# Patient Record
Sex: MALE | Race: BLACK OR AFRICAN AMERICAN | Employment: FULL TIME | URBAN - METROPOLITAN AREA
[De-identification: names, ages, dates, MRNs, and addresses within clinical notes are randomized per-mention and may not be internally consistent; named-entity substitution may affect disease eponyms.]

---

## 2017-02-18 ENCOUNTER — ED HISTORICAL/CONVERTED ENCOUNTER (OUTPATIENT)
Dept: OTHER | Age: 57
End: 2017-02-18

## 2018-04-27 ENCOUNTER — ED HISTORICAL/CONVERTED ENCOUNTER (OUTPATIENT)
Dept: OTHER | Age: 58
End: 2018-04-27

## 2018-07-12 ENCOUNTER — OP HISTORICAL/CONVERTED ENCOUNTER (OUTPATIENT)
Dept: OTHER | Age: 58
End: 2018-07-12

## 2018-08-17 ENCOUNTER — ED HISTORICAL/CONVERTED ENCOUNTER (OUTPATIENT)
Dept: OTHER | Age: 58
End: 2018-08-17

## 2019-01-21 ENCOUNTER — ED HISTORICAL/CONVERTED ENCOUNTER (OUTPATIENT)
Dept: OTHER | Age: 59
End: 2019-01-21

## 2020-09-12 ENCOUNTER — HOSPITAL ENCOUNTER (EMERGENCY)
Age: 60
Discharge: HOME | End: 2020-09-12
Attending: EMERGENCY MEDICINE
Payer: COMMERCIAL

## 2020-09-12 ENCOUNTER — APPOINTMENT (OUTPATIENT)
Dept: GENERAL RADIOLOGY | Age: 60
End: 2020-09-12
Attending: EMERGENCY MEDICINE
Payer: COMMERCIAL

## 2020-09-12 VITALS
BODY MASS INDEX: 31.39 KG/M2 | RESPIRATION RATE: 18 BRPM | HEIGHT: 67 IN | HEART RATE: 64 BPM | WEIGHT: 200 LBS | SYSTOLIC BLOOD PRESSURE: 174 MMHG | TEMPERATURE: 98.3 F | OXYGEN SATURATION: 99 % | DIASTOLIC BLOOD PRESSURE: 102 MMHG

## 2020-09-12 DIAGNOSIS — R07.89 ATYPICAL CHEST PAIN: Primary | ICD-10-CM

## 2020-09-12 LAB
ALBUMIN SERPL-MCNC: 3.7 G/DL (ref 3.5–5)
ALBUMIN/GLOB SERPL: 0.8 {RATIO} (ref 1.1–2.2)
ALP SERPL-CCNC: 83 U/L (ref 45–117)
ALT SERPL-CCNC: 117 U/L (ref 12–78)
ANION GAP SERPL CALC-SCNC: 11 MMOL/L (ref 5–15)
AST SERPL W P-5'-P-CCNC: 171 U/L (ref 15–37)
BASOPHILS # BLD: 0 K/UL (ref 0–0.1)
BASOPHILS NFR BLD: 1 % (ref 0–1)
BILIRUB SERPL-MCNC: 0.9 MG/DL (ref 0.2–1)
BUN SERPL-MCNC: 5 MG/DL (ref 6–20)
BUN/CREAT SERPL: 5 (ref 12–20)
CA-I BLD-MCNC: 9.2 MG/DL (ref 8.5–10.1)
CHLORIDE SERPL-SCNC: 98 MMOL/L (ref 97–108)
CO2 SERPL-SCNC: 29 MMOL/L (ref 21–32)
CREAT SERPL-MCNC: 0.99 MG/DL (ref 0.7–1.3)
DIFFERENTIAL METHOD BLD: ABNORMAL
EOSINOPHIL # BLD: 0.2 K/UL (ref 0–0.4)
EOSINOPHIL NFR BLD: 3 % (ref 0–7)
ERYTHROCYTE [DISTWIDTH] IN BLOOD BY AUTOMATED COUNT: 13.9 % (ref 11.5–14.5)
GLOBULIN SER CALC-MCNC: 4.8 G/DL (ref 2–4)
GLUCOSE SERPL-MCNC: 132 MG/DL (ref 65–100)
HCT VFR BLD AUTO: 43.1 % (ref 36.6–50.3)
HGB BLD-MCNC: 15.3 % (ref 12.1–17)
IMM GRANULOCYTES # BLD AUTO: 0 K/UL (ref 0–0.04)
IMM GRANULOCYTES NFR BLD AUTO: 0 % (ref 0–0.5)
LYMPHOCYTES # BLD: 2 K/UL (ref 0.8–3.5)
LYMPHOCYTES NFR BLD: 35 % (ref 12–49)
MCH RBC QN AUTO: 34.9 PG (ref 26–34)
MCHC RBC AUTO-ENTMCNC: 35.5 G/DL (ref 30–36.5)
MCV RBC AUTO: 98.4 FL (ref 80–99)
MONOCYTES # BLD: 0.6 K/UL (ref 0–1)
MONOCYTES NFR BLD: 10 % (ref 5–13)
NEUTS SEG # BLD: 2.9 K/UL (ref 1.8–8)
NEUTS SEG NFR BLD: 51 % (ref 32–75)
PLATELET # BLD AUTO: 253 K/UL (ref 150–400)
PMV BLD AUTO: 12.1 FL (ref 8.9–12.9)
POTASSIUM SERPL-SCNC: 2.9 MMOL/L (ref 3.5–5.1)
PROT SERPL-MCNC: 8.5 G/DL (ref 6.4–8.2)
RBC # BLD AUTO: 4.38 M/UL (ref 4.1–5.7)
SODIUM SERPL-SCNC: 138 MMOL/L (ref 136–145)
TROPONIN I SERPL-MCNC: <0.05 NG/ML
WBC # BLD AUTO: 5.6 K/UL (ref 4.1–11.1)

## 2020-09-12 PROCEDURE — 36415 COLL VENOUS BLD VENIPUNCTURE: CPT

## 2020-09-12 PROCEDURE — 99285 EMERGENCY DEPT VISIT HI MDM: CPT

## 2020-09-12 PROCEDURE — 85025 COMPLETE CBC W/AUTO DIFF WBC: CPT

## 2020-09-12 PROCEDURE — 71045 X-RAY EXAM CHEST 1 VIEW: CPT

## 2020-09-12 PROCEDURE — 80053 COMPREHEN METABOLIC PANEL: CPT

## 2020-09-12 PROCEDURE — 84484 ASSAY OF TROPONIN QUANT: CPT

## 2020-09-12 PROCEDURE — 99284 EMERGENCY DEPT VISIT MOD MDM: CPT

## 2020-09-12 PROCEDURE — 93005 ELECTROCARDIOGRAM TRACING: CPT

## 2020-09-12 PROCEDURE — 74011250637 HC RX REV CODE- 250/637: Performed by: EMERGENCY MEDICINE

## 2020-09-12 RX ORDER — POTASSIUM CHLORIDE 750 MG/1
40 TABLET, FILM COATED, EXTENDED RELEASE ORAL
Status: COMPLETED | OUTPATIENT
Start: 2020-09-12 | End: 2020-09-12

## 2020-09-12 RX ORDER — GUAIFENESIN 100 MG/5ML
81 LIQUID (ML) ORAL
Status: COMPLETED | OUTPATIENT
Start: 2020-09-12 | End: 2020-09-12

## 2020-09-12 RX ORDER — DILTIAZEM HYDROCHLORIDE 240 MG/1
240 CAPSULE, EXTENDED RELEASE ORAL DAILY
COMMUNITY

## 2020-09-12 RX ORDER — METFORMIN HYDROCHLORIDE 500 MG/1
500 TABLET ORAL 2 TIMES DAILY WITH MEALS
Status: ON HOLD | COMMUNITY
End: 2021-09-15

## 2020-09-12 RX ORDER — METOPROLOL TARTRATE 50 MG/1
50 TABLET ORAL DAILY
COMMUNITY
End: 2020-09-15

## 2020-09-12 RX ORDER — SIMVASTATIN 20 MG/1
20 TABLET, FILM COATED ORAL
COMMUNITY

## 2020-09-12 RX ADMIN — POTASSIUM CHLORIDE 40 MEQ: 750 TABLET, FILM COATED, EXTENDED RELEASE ORAL at 12:02

## 2020-09-12 RX ADMIN — ASPIRIN 81 MG CHEWABLE TABLET 81 MG: 81 TABLET CHEWABLE at 09:00

## 2020-09-12 RX ADMIN — POTASSIUM CHLORIDE 40 MEQ: 750 TABLET, FILM COATED, EXTENDED RELEASE ORAL at 13:43

## 2020-09-12 NOTE — ED TRIAGE NOTES
Patient started having palpitations yesterday.  Has had episodes of on and off chest pain accompanied by SOB

## 2020-09-12 NOTE — ED PROVIDER NOTES
The history is provided by the patient. No  was used. Chest Pain (Angina)    This is a recurrent problem. The current episode started 1 to 2 hours ago. The problem has not changed since onset. The pain is associated with exertion, normal activity and stress. The pain is at a severity of 3/10. The pain is moderate. The quality of the pain is described as pressure-like. The pain does not radiate. Associated symptoms include diaphoresis, dizziness, palpitations, shortness of breath and vomiting. Pertinent negatives include no abdominal pain, no cough, no fever, no nausea and no weakness. He has tried nothing for the symptoms. Risk factors include cardiac disease, hypertension and male gender. Shortness of Breath   Associated symptoms include sore throat, chest pain and vomiting. Pertinent negatives include no fever, no rhinorrhea, no ear pain, no cough, no wheezing, no abdominal pain, no rash and no leg swelling. Palpitations    This is a recurrent problem. The current episode started 1 to 2 hours ago. The problem has not changed since onset. Associated symptoms include diaphoresis, chest pain, vomiting, dizziness and shortness of breath. Pertinent negatives include no fever, no abdominal pain, no nausea, no weakness and no cough. Risk factors include male gender, hypertension and cardiac disease.         Past Medical History:   Diagnosis Date    CAD (coronary artery disease)     Diabetes ([address]     Hypertension        Past Surgical History:   Procedure Laterality Date    IR INJ SINUS TRACT THERAP  1998         Family History:   Problem Relation Age of Onset    Hypertension Mother     Hypertension Father     Diabetes Father     Arrhythmia Sister     Hypertension Brother     Stroke Brother     Hypertension Sister        Social History     Socioeconomic History    Marital status:      Spouse name: Not on file    Number of children: Not on file    Years of education: Not on file  Highest education level: Not on file   Occupational History    Not on file   Social Needs    Financial resource strain: Not on file    Food insecurity     Worry: Not on file     Inability: Not on file    Transportation needs     Medical: Not on file     Non-medical: Not on file   Tobacco Use    Smoking status: Former Smoker     Types: Cigarettes     Last attempt to quit: 1/1/2010     Years since quitting: 10.7    Smokeless tobacco: Never Used   Substance and Sexual Activity    Alcohol use: Yes     Alcohol/week: 2.5 - 3.3 standard drinks     Types: 3 - 4 Standard drinks or equivalent per week     Comment: occasional    Drug use: No    Sexual activity: Yes     Partners: Female   Lifestyle    Physical activity     Days per week: Not on file     Minutes per session: Not on file    Stress: Not on file   Relationships    Social connections     Talks on phone: Not on file     Gets together: Not on file     Attends Yazdanism service: Not on file     Active member of club or organization: Not on file     Attends meetings of clubs or organizations: Not on file     Relationship status: Not on file    Intimate partner violence     Fear of current or ex partner: Not on file     Emotionally abused: Not on file     Physically abused: Not on file     Forced sexual activity: Not on file   Other Topics Concern    Not on file   Social History Narrative    Not on file         ALLERGIES: Patient has no known allergies. Review of Systems   Constitutional: Positive for diaphoresis. Negative for fever. HENT: Positive for sore throat. Negative for congestion, ear pain and rhinorrhea. Eyes: Positive for photophobia. Respiratory: Positive for chest tightness and shortness of breath. Negative for cough, wheezing and stridor. Cardiovascular: Positive for chest pain and palpitations. Negative for leg swelling. Gastrointestinal: Positive for vomiting. Negative for abdominal pain and nausea.    Endocrine: Negative for polydipsia and polyuria. Genitourinary: Negative for dysuria. Musculoskeletal: Negative for arthralgias and joint swelling. Skin: Positive for color change. Negative for rash. Neurological: Positive for dizziness and light-headedness. Negative for syncope and weakness. Psychiatric/Behavioral: Negative for confusion and suicidal ideas. Vitals:    09/12/20 0504   BP: (!) 166/94   Pulse: 63   Resp: 20   Temp: 98.9 °F (37.2 °C)   SpO2: 98%   Weight: 90.7 kg (200 lb)   Height: 5' 7\" (1.702 m)            Physical Exam  Vitals signs and nursing note reviewed. Constitutional:       Appearance: He is well-developed. HENT:      Head: Normocephalic and atraumatic. Eyes:      Pupils: Pupils are equal, round, and reactive to light. Neck:      Thyroid: No thyromegaly. Cardiovascular:      Rate and Rhythm: Normal rate. Rhythm irregular. Heart sounds: Normal heart sounds. Pulmonary:      Effort: Pulmonary effort is normal.      Breath sounds: Normal breath sounds. Chest:      Chest wall: No deformity or tenderness. Abdominal:      General: Bowel sounds are normal.      Palpations: Abdomen is soft. Musculoskeletal: Normal range of motion. Right lower leg: No edema. Left lower leg: No edema. Skin:     General: Skin is warm and dry. Neurological:      General: No focal deficit present. Mental Status: He is alert. Psychiatric:         Mood and Affect: Mood is anxious. MDM  Number of Diagnoses or Management Options  Diagnosis management comments: Patient with a history of irregular heartbeat followed by a cardiologist and is been put on diltiazem and Toprol for for control he has done well but tonight he developed spontaneous symptoms of palpitations treated with chest pain. Check cardiac enzymes EKG is normal and we will check electrolytes and magnesium level and chest x-ray.     ED Course as of Sep 12 0601   Sat Sep 12, 2020   0600 Sinus rhythm at a rate of 67 bpm there is a normal sinus rhythm there is no ectopy there is there is normal QRS complexes and intervals there are no ST or T wave changes    [RL]      ED Course User Index  [RL] Marcio Barrera MD       Impression     Atypical chest pain

## 2020-09-12 NOTE — DISCHARGE INSTRUCTIONS

## 2020-09-13 ENCOUNTER — HOSPITAL ENCOUNTER (OUTPATIENT)
Age: 60
Setting detail: OBSERVATION
Discharge: HOME | End: 2020-09-15
Attending: EMERGENCY MEDICINE | Admitting: FAMILY MEDICINE
Payer: COMMERCIAL

## 2020-09-13 ENCOUNTER — APPOINTMENT (OUTPATIENT)
Dept: GENERAL RADIOLOGY | Age: 60
End: 2020-09-13
Attending: EMERGENCY MEDICINE
Payer: COMMERCIAL

## 2020-09-13 DIAGNOSIS — I10 ESSENTIAL HYPERTENSION: ICD-10-CM

## 2020-09-13 DIAGNOSIS — I49.9 CARDIAC ARRHYTHMIA, UNSPECIFIED CARDIAC ARRHYTHMIA TYPE: ICD-10-CM

## 2020-09-13 DIAGNOSIS — R55 NEAR SYNCOPE: Primary | ICD-10-CM

## 2020-09-13 PROBLEM — E11.9 DIABETES MELLITUS TYPE 2, CONTROLLED (HCC): Status: ACTIVE | Noted: 2020-09-13

## 2020-09-13 LAB
ANION GAP SERPL CALC-SCNC: 7 MMOL/L (ref 5–15)
BASOPHILS # BLD: 0 K/UL (ref 0–0.1)
BASOPHILS NFR BLD: 0 % (ref 0–1)
BUN SERPL-MCNC: 7 MG/DL (ref 6–20)
BUN/CREAT SERPL: 6 (ref 12–20)
CA-I BLD-MCNC: 8.9 MG/DL (ref 8.5–10.1)
CHLORIDE SERPL-SCNC: 101 MMOL/L (ref 97–108)
CO2 SERPL-SCNC: 30 MMOL/L (ref 21–32)
CREAT SERPL-MCNC: 1.23 MG/DL (ref 0.7–1.3)
DIFFERENTIAL METHOD BLD: ABNORMAL
EOSINOPHIL # BLD: 0.2 K/UL (ref 0–0.4)
EOSINOPHIL NFR BLD: 2 % (ref 0–7)
ERYTHROCYTE [DISTWIDTH] IN BLOOD BY AUTOMATED COUNT: 14.2 % (ref 11.5–14.5)
GLUCOSE BLD STRIP.AUTO-MCNC: 124 MG/DL (ref 65–100)
GLUCOSE SERPL-MCNC: 125 MG/DL (ref 65–100)
HCT VFR BLD AUTO: 48.8 % (ref 36.6–50.3)
HGB BLD-MCNC: 17.7 % (ref 12.1–17)
IMM GRANULOCYTES # BLD AUTO: 0 K/UL (ref 0–0.04)
IMM GRANULOCYTES NFR BLD AUTO: 0 % (ref 0–0.5)
LYMPHOCYTES # BLD: 4.4 K/UL (ref 0.8–3.5)
LYMPHOCYTES NFR BLD: 49 % (ref 12–49)
MAGNESIUM SERPL-MCNC: 1.4 MG/DL (ref 1.6–2.4)
MCH RBC QN AUTO: 36.2 PG (ref 26–34)
MCHC RBC AUTO-ENTMCNC: 36.3 G/DL (ref 30–36.5)
MCV RBC AUTO: 99.8 FL (ref 80–99)
MONOCYTES # BLD: 1 K/UL (ref 0–1)
MONOCYTES NFR BLD: 11 % (ref 5–13)
NEUTS SEG # BLD: 3.5 K/UL (ref 1.8–8)
NEUTS SEG NFR BLD: 38 % (ref 32–75)
PERFORMED BY, TECHID: ABNORMAL
PLATELET # BLD AUTO: 270 K/UL (ref 150–400)
PMV BLD AUTO: 12.1 FL (ref 8.9–12.9)
POTASSIUM SERPL-SCNC: 3.8 MMOL/L (ref 3.5–5.1)
RBC # BLD AUTO: 4.89 M/UL (ref 4.1–5.7)
SODIUM SERPL-SCNC: 138 MMOL/L (ref 136–145)
TROPONIN I SERPL-MCNC: <0.05 NG/ML
TSH SERPL DL<=0.05 MIU/L-ACNC: 2.63 UIU/ML (ref 0.36–3.74)
WBC # BLD AUTO: 9.1 K/UL (ref 4.1–11.1)

## 2020-09-13 PROCEDURE — 74011250637 HC RX REV CODE- 250/637: Performed by: EMERGENCY MEDICINE

## 2020-09-13 PROCEDURE — 80048 BASIC METABOLIC PNL TOTAL CA: CPT

## 2020-09-13 PROCEDURE — 71045 X-RAY EXAM CHEST 1 VIEW: CPT

## 2020-09-13 PROCEDURE — 36415 COLL VENOUS BLD VENIPUNCTURE: CPT

## 2020-09-13 PROCEDURE — 85025 COMPLETE CBC W/AUTO DIFF WBC: CPT

## 2020-09-13 PROCEDURE — 74011250637 HC RX REV CODE- 250/637: Performed by: INTERNAL MEDICINE

## 2020-09-13 PROCEDURE — 99285 EMERGENCY DEPT VISIT HI MDM: CPT

## 2020-09-13 PROCEDURE — 99218 HC RM OBSERVATION: CPT

## 2020-09-13 PROCEDURE — 84484 ASSAY OF TROPONIN QUANT: CPT

## 2020-09-13 PROCEDURE — 93005 ELECTROCARDIOGRAM TRACING: CPT | Performed by: EMERGENCY MEDICINE

## 2020-09-13 PROCEDURE — 84443 ASSAY THYROID STIM HORMONE: CPT

## 2020-09-13 PROCEDURE — 82962 GLUCOSE BLOOD TEST: CPT

## 2020-09-13 PROCEDURE — 74011250637 HC RX REV CODE- 250/637: Performed by: PHYSICIAN ASSISTANT

## 2020-09-13 PROCEDURE — 93005 ELECTROCARDIOGRAM TRACING: CPT | Performed by: PHYSICIAN ASSISTANT

## 2020-09-13 PROCEDURE — 83735 ASSAY OF MAGNESIUM: CPT

## 2020-09-13 RX ORDER — FAMOTIDINE 20 MG/1
20 TABLET, FILM COATED ORAL 2 TIMES DAILY
Status: DISCONTINUED | OUTPATIENT
Start: 2020-09-13 | End: 2020-09-15 | Stop reason: HOSPADM

## 2020-09-13 RX ORDER — METFORMIN HYDROCHLORIDE 500 MG/1
500 TABLET ORAL 2 TIMES DAILY WITH MEALS
Status: DISCONTINUED | OUTPATIENT
Start: 2020-09-13 | End: 2020-09-15 | Stop reason: HOSPADM

## 2020-09-13 RX ORDER — HYDRALAZINE HYDROCHLORIDE 20 MG/ML
20 INJECTION INTRAMUSCULAR; INTRAVENOUS
Status: DISCONTINUED | OUTPATIENT
Start: 2020-09-13 | End: 2020-09-15 | Stop reason: HOSPADM

## 2020-09-13 RX ORDER — SIMVASTATIN 10 MG/1
20 TABLET, FILM COATED ORAL
Status: DISCONTINUED | OUTPATIENT
Start: 2020-09-13 | End: 2020-09-15 | Stop reason: HOSPADM

## 2020-09-13 RX ORDER — ONDANSETRON 2 MG/ML
4 INJECTION INTRAMUSCULAR; INTRAVENOUS
Status: DISCONTINUED | OUTPATIENT
Start: 2020-09-13 | End: 2020-09-15 | Stop reason: HOSPADM

## 2020-09-13 RX ORDER — METOPROLOL TARTRATE 50 MG/1
50 TABLET ORAL 2 TIMES DAILY
Status: DISCONTINUED | OUTPATIENT
Start: 2020-09-13 | End: 2020-09-15 | Stop reason: HOSPADM

## 2020-09-13 RX ORDER — ONDANSETRON 4 MG/1
4 TABLET, ORALLY DISINTEGRATING ORAL
Status: DISCONTINUED | OUTPATIENT
Start: 2020-09-13 | End: 2020-09-15 | Stop reason: HOSPADM

## 2020-09-13 RX ORDER — ENOXAPARIN SODIUM 100 MG/ML
40 INJECTION SUBCUTANEOUS DAILY
Status: DISCONTINUED | OUTPATIENT
Start: 2020-09-14 | End: 2020-09-15 | Stop reason: HOSPADM

## 2020-09-13 RX ORDER — POLYETHYLENE GLYCOL 3350 17 G/17G
17 POWDER, FOR SOLUTION ORAL DAILY PRN
Status: DISCONTINUED | OUTPATIENT
Start: 2020-09-13 | End: 2020-09-15 | Stop reason: HOSPADM

## 2020-09-13 RX ORDER — ASPIRIN 325 MG
325 TABLET ORAL
Status: COMPLETED | OUTPATIENT
Start: 2020-09-13 | End: 2020-09-13

## 2020-09-13 RX ORDER — ACETAMINOPHEN 650 MG/1
650 SUPPOSITORY RECTAL
Status: DISCONTINUED | OUTPATIENT
Start: 2020-09-13 | End: 2020-09-15 | Stop reason: HOSPADM

## 2020-09-13 RX ORDER — SODIUM CHLORIDE 0.9 % (FLUSH) 0.9 %
5-40 SYRINGE (ML) INJECTION EVERY 8 HOURS
Status: DISCONTINUED | OUTPATIENT
Start: 2020-09-13 | End: 2020-09-15 | Stop reason: HOSPADM

## 2020-09-13 RX ORDER — MAGNESIUM SULFATE 100 %
4 CRYSTALS MISCELLANEOUS AS NEEDED
Status: DISCONTINUED | OUTPATIENT
Start: 2020-09-13 | End: 2020-09-15 | Stop reason: HOSPADM

## 2020-09-13 RX ORDER — INSULIN LISPRO 100 [IU]/ML
INJECTION, SOLUTION INTRAVENOUS; SUBCUTANEOUS 4 TIMES DAILY
Status: DISCONTINUED | OUTPATIENT
Start: 2020-09-13 | End: 2020-09-15 | Stop reason: HOSPADM

## 2020-09-13 RX ORDER — ACETAMINOPHEN 325 MG/1
650 TABLET ORAL
Status: DISCONTINUED | OUTPATIENT
Start: 2020-09-13 | End: 2020-09-15 | Stop reason: HOSPADM

## 2020-09-13 RX ORDER — SODIUM CHLORIDE 0.9 % (FLUSH) 0.9 %
5-40 SYRINGE (ML) INJECTION AS NEEDED
Status: DISCONTINUED | OUTPATIENT
Start: 2020-09-13 | End: 2020-09-15 | Stop reason: HOSPADM

## 2020-09-13 RX ORDER — POTASSIUM CHLORIDE 1.5 G/1.77G
20 POWDER, FOR SOLUTION ORAL 2 TIMES DAILY WITH MEALS
Status: DISCONTINUED | OUTPATIENT
Start: 2020-09-13 | End: 2020-09-15 | Stop reason: HOSPADM

## 2020-09-13 RX ORDER — DEXTROSE 50 % IN WATER (D50W) INTRAVENOUS SYRINGE
25-50 AS NEEDED
Status: DISCONTINUED | OUTPATIENT
Start: 2020-09-13 | End: 2020-09-15 | Stop reason: HOSPADM

## 2020-09-13 RX ORDER — DILTIAZEM HYDROCHLORIDE 120 MG/1
240 CAPSULE, COATED, EXTENDED RELEASE ORAL DAILY
Status: DISCONTINUED | OUTPATIENT
Start: 2020-09-14 | End: 2020-09-15 | Stop reason: HOSPADM

## 2020-09-13 RX ORDER — ASPIRIN 325 MG
325 TABLET ORAL DAILY
Status: DISCONTINUED | OUTPATIENT
Start: 2020-09-14 | End: 2020-09-15 | Stop reason: HOSPADM

## 2020-09-13 RX ADMIN — ASPIRIN 325 MG ORAL TABLET 325 MG: 325 PILL ORAL at 13:25

## 2020-09-13 RX ADMIN — NITROGLYCERIN 1 INCH: 20 OINTMENT TOPICAL at 13:25

## 2020-09-13 RX ADMIN — SIMVASTATIN 20 MG: 10 TABLET, FILM COATED ORAL at 23:08

## 2020-09-13 RX ADMIN — Medication 10 ML: at 21:28

## 2020-09-13 RX ADMIN — POTASSIUM CHLORIDE 20 MEQ: 1.5 FOR SOLUTION ORAL at 21:25

## 2020-09-13 RX ADMIN — FAMOTIDINE 20 MG: 20 TABLET ORAL at 21:25

## 2020-09-13 NOTE — H&P
History and Physical    Patient: Annmarie Olsen MRN: [id number]  SSN: [id number]    YOB: 1960  Age: 64 y.o. Sex: male      Subjective:      Annmarie Olsen is a 64 y.o. male who black male who presented to the ER on 9/13/2020 for palpitations. He said he has been having on and off palpitations for several weeks. He does have a history of atrial fibrillation. Not on anticoagulation. He said recently his primary care physician increased his dose of diltiazem and decreased his dose of metoprolol due to side effects. While he was in the waiting room he said he had a near syncopal episode where he got diaphoretic. He says he does have some chest pains when he is feeling the palpitations. Currently patient is chest pain-free. He did have an episode of diarrhea which she contributes it to the metformin. In the ED chest x-ray did not show any acute cardiopulmonary process. EKG showed normal sinus rhythm. Cardiology consulted increase patient's metoprolol to 50 mg twice daily. Laboratory data showed a potassium of 2.9. Most likely secondary to diarrhea    Past Medical History:   Diagnosis Date    CAD (coronary artery disease)     Diabetes ([address]     Hypertension      Past Surgical History:   Procedure Laterality Date    IR INJ SINUS TRACT THERAP  1998      Family History   Problem Relation Age of Onset    Hypertension Mother     Hypertension Father     Diabetes Father     Arrhythmia Sister     Hypertension Brother     Stroke Brother     Hypertension Sister      Social History     Tobacco Use    Smoking status: Former Smoker     Types: Cigarettes     Last attempt to quit: 1/1/2010     Years since quitting: 10.7    Smokeless tobacco: Never Used   Substance Use Topics    Alcohol use:  Yes     Alcohol/week: 2.5 - 3.3 standard drinks     Types: 3 - 4 Standard drinks or equivalent per week     Comment: occasional      Prior to Admission medications    Medication Sig Start Date End Date Taking? Authorizing Provider   dilTIAZem ER [facility]) 240 mg capsule Take 240 mg by mouth daily. Candelario Mcgill MD   metoprolol tartrate (LOPRESSOR) 50 mg tablet Take 50 mg by mouth daily. Candelario Mcgill MD   metFORMIN (GLUCOPHAGE) 500 mg tablet Take 500 mg by mouth two (2) times daily (with meals). Candelario Mcgill MD   vit B Cmplx 3-FA-Vit C-Biotin (NEPHRO PANFILO RX) 1- mg-mg-mcg tablet Take 1 Tab by mouth daily. Candelario Mcgill MD   simvastatin (ZOCOR) 20 mg tablet Take 20 mg by mouth nightly. Candelario Mcgill MD   aspirin (ASPIRIN) 325 mg tablet Take 325 mg by mouth daily. Provider, Historical        No Known Allergies    Review of Systems:  Constitutional: No fevers, No chills, No fatigue, No weakness  Eyes: No visual disturbance  Ears, Nose, Mouth, Throat, and Face: No nasal congestion, No sore throat  Respiratory: No cough, No sputum, No wheezing, No SOB  Cardiovascular: No chest pain, No lower extremity edema, ++ Palpitations   Gastrointestinal: No nausea, No vomiting, ++ diarrhea, No constipation, No abdominal pain  Genitourinary: No frequency, No dysuria, No hematuria  Integument/Breast: No rash, No skin lesion(s), No dryness  Musculoskeletal: No arthralgias, No neck pain, No back pain  Neurological: No headaches, No dizziness, No confusion,  No seizures  Behavioral/Psychiatric: No anxiety, No depression      Objective:     Vitals:    09/13/20 1254   BP: (!) 185/98   Pulse: 72   Resp: 16   Temp: 98.2 °F (36.8 °C)   SpO2: 100%   Weight: 90.7 kg (200 lb)   Height: 5' 7\" (1.702 m)        Physical Exam:  General: alert, cooperative, no distress  Eye: conjunctivae/corneas clear. PERRL, EOM's intact. Throat and Neck: normal and no erythema or exudates noted. No mass   Lung: clear to auscultation bilaterally  Heart: irregular,   Abdomen: soft, non-tender. Bowel sounds normal. No masses,  Extremities:  able to move all extremities normal, atraumatic  Skin: Normal.  Neurologic: AOx3.  Cranial nerves 2-12 and sensation grossly intact. Psychiatric: non focal    Recent Results (from the past 24 hour(s))   CBC WITH AUTOMATED DIFF    Collection Time: 09/13/20  1:30 PM   Result Value Ref Range    WBC 9.1 4.1 - 11.1 K/uL    RBC 4.89 4. 10 - 5.70 M/uL    HGB 17.7 (H) 12.1 - 17.0 %    HCT 48.8 36.6 - 50.3 %    MCV 99.8 (H) 80.0 - 99.0 FL    MCH 36.2 (H) 26.0 - 34.0 PG    MCHC 36.3 30.0 - 36.5 g/dL    RDW 14.2 11.5 - 14.5 %    PLATELET 473 833 - 468 K/uL    MPV 12.1 8.9 - 12.9 FL    NEUTROPHILS 38 32 - 75 %    LYMPHOCYTES 49 12 - 49 %    MONOCYTES 11 5 - 13 %    EOSINOPHILS 2 0 - 7 %    BASOPHILS 0 0 - 1 %    IMMATURE GRANULOCYTES 0 0.0 - 0.5 %    ABS. NEUTROPHILS 3.5 1.8 - 8.0 K/UL    ABS. LYMPHOCYTES 4.4 (H) 0.8 - 3.5 K/UL    ABS. MONOCYTES 1.0 0.0 - 1.0 K/UL    ABS. EOSINOPHILS 0.2 0.0 - 0.4 K/UL    ABS. BASOPHILS 0.0 0.0 - 0.1 K/UL    ABS. IMM. GRANS. 0.0 0.00 - 0.04 K/UL    DF AUTOMATED         XR Results (maximum last 3): Results from Hospital Encounter encounter on 09/13/20   XR CHEST PORT    Narrative Single frontal view of the chest compared to prior exam dated 9/12/2020. Heart  size is normal. Lungs are clear of infiltrate. No pulmonary nodule or pleural  effusion is seen. No bony abnormalities are identified. Impression Impression: No acute abnormality demonstrated, unchanged. Results from [place] encounter on 09/12/20   XR CHEST PORT    Narrative Single AP upright view of the chest. No prior films are currently available for  comparison. Heart size is normal. Lungs are clear of infiltrate. No pulmonary  nodule or pleural effusion is seen. No bony abnormalities are identified. Impression Impression: Negative. CT Results (maximum last 3): No results found for this or any previous visit. MRI Results (maximum last 3): No results found for this or any previous visit. Nuclear Medicine Results (maximum last 3): No results found for this or any previous visit.     US Results (maximum last 3): No results found for this or any previous visit. Assessment:   1. Palpitations with a history of proximal atrial fibrillation  2. Near syncopal episode  3. Hypertension  4. Type 2 diabetes  5. Hyperlipidemia  6. Symptomatic hypokalemia    Plan:   1. We will consult cardiology. Continue with cardiac telemetry. Troponin x1-. EKG showed normal sinus rhythm. EKG in the AM. They have increased patient's beta-blocker to 50 mg twice daily. Patient is alert and orient x4.  2.  Blood pressure is elevated. Continue with diltiazem, metoprolol. Have added on IV hydralazine as needed to keep his systolic blood pressure under 160  3. Continue with metformin. Will add on insulin sliding scale  4. Continue with aspirin and statin  5. Potassium is low. We will supplement. Check a magnesium level  6. CBC BMP in the a.m.  7.  Lovenox for DVT prophylaxis  8. Full code  9.   Place in observation    Total Time: 61 min     Signed By: Dany Butler PA-C     September 13, 2020

## 2020-09-13 NOTE — CONSULTS
Consult    Patient: Alexis Perez MRN: [id number]  SSN: [id number]    YOB: 1960  Age: 64 y.o. Sex: male      Subjective:      Alexis Perez is a 64 y.o. male who is being seen for palpitations and near syncope. He follows up with Dr. Syed Taylor regularly. Last time he saw him about a year ago. He had a monitor that showed PVCs. He had similar complaints before and he was admitted for hypokalemia. Patient with diabetes and presented with frequent palpitation and getting worse and so he drove himself to the emergency room and upon arrival he had one severe episode of palpitation and he almost passed out but never did. Patient was on metoprolol 50 mg twice a day at some point that has controlled his PVCs better but his primary care physician for some reason reduce the dose to 50 mg daily. He is complaining of diarrhea recently which probably explains why his potassium is low. At some point he was told that he might have had sleep apnea but never had an official sleep study. Past Medical History:   Diagnosis Date    CAD (coronary artery disease)     [address] [address] [facility]     Hypertension      Past Surgical History:   Procedure Laterality Date    IR INJ SINUS TRACT THERAP  1998      Family History   Problem Relation Age of Onset    Hypertension Mother     Hypertension Father     Diabetes Father     Arrhythmia Sister     Hypertension Brother     Stroke Brother     Hypertension Sister      Social History     Tobacco Use    Smoking status: Former Smoker     Types: Cigarettes     Last attempt to quit: 1/1/2010     Years since quitting: 10.7    Smokeless tobacco: Never Used   Substance Use Topics    Alcohol use: Yes     Alcohol/week: 2.5 - 3.3 standard drinks     Types: 3 - 4 Standard drinks or equivalent per week     Comment: occasional      Current Outpatient Medications   Medication Sig Dispense Refill    dilTIAZem ER (TIAZAC) 240 mg capsule Take 240 mg by mouth daily.       metoprolol tartrate (LOPRESSOR) 50 mg tablet Take 50 mg by mouth daily.  metFORMIN (GLUCOPHAGE) 500 mg tablet Take 500 mg by mouth two (2) times daily (with meals).  vit B Cmplx 3-FA-Vit C-Biotin (NEPHRO PANFILO RX) 1- mg-mg-mcg tablet Take 1 Tab by mouth daily.  simvastatin (ZOCOR) 20 mg tablet Take 20 mg by mouth nightly.  aspirin (ASPIRIN) 325 mg tablet Take 325 mg by mouth daily. No Known Allergies    Review of Systems:  A comprehensive review of systems was negative except for that written in the History of Present Illness. Objective:     Vitals:    09/13/20 1254   BP: (!) 185/98   Pulse: 72   Resp: 16   Temp: 98.2 °F (36.8 °C)   SpO2: 100%   Weight: 90.7 kg (200 lb)   Height: 5' 7\" (1.702 m)        Physical Exam:  General:  Alert, cooperative, no distress, appears stated age. Eyes:  Conjunctivae/corneas clear. PERRL, EOMs intact. Fundi benign   Ears:  Normal TMs and external ear canals both ears. Nose: Nares normal. Septum midline. Mucosa normal. No drainage or sinus tenderness. Mouth/Throat: Lips, mucosa, and tongue normal. Teeth and gums normal.   Neck: Supple, symmetrical, trachea midline, no adenopathy, thyroid: no enlargment/tenderness/nodules, no carotid bruit and no JVD. Back:   Symmetric, no curvature. ROM normal. No CVA tenderness. Lungs:   Clear to auscultation bilaterally. Heart:  Regular rate and rhythm, S1, S2 normal, no murmur, click, rub or gallop. Abdomen:   Soft, non-tender. Bowel sounds normal. No masses,  No organomegaly. Extremities: Extremities normal, atraumatic, no cyanosis or edema. Pulses: 2+ and symmetric all extremities. Skin: Skin color, texture, turgor normal. No rashes or lesions   Lymph nodes: Cervical, supraclavicular, and axillary nodes normal.   Neurologic: CNII-XII intact. Normal strength, sensation and reflexes throughout.        Assessment:     Hospital Problems  Date Reviewed: 12/28/2011    None      Patient is 51-year-old -American male with:    1. Near syncope, frequent PVCs  2. Hypokalemia  3. Hypertensive emergency  4. Dyslipidemia  5. Lone atrial fibrillation    6. Obstructive sleep apnea  7. Gastroesophageal reflux disease (GERD)  8. History of pancreatitis  9. Diabetes mellitus      Plan:     EKG shows sinus rhythm, LVH, PVC without acute ischemic changes. He appears to be euvolemic. His potassium is 2.9, sodium 138. Try to maintain potassium more than 4. Check magnesium level and try to maintain the level more than 2. Previously echocardiogram showed normal LV systolic function. His troponin were negative. We will admit to telemetry and increase his metoprolol to 50 mg twice a day. Monitor his blood pressure and his heart rate closely. Dr. Sneha Tello will assume his care in a.m. Consider outpatient sleep study  Thank you  for involving me in this nice patient's care.   Signed By: Todd Seymour MD     September 13, 2020

## 2020-09-13 NOTE — LETTER
NOTIFICATION RETURN TO WORK / SCHOOL 
 
9/15/2020 9:19 AM 
 
Mr. Any Almaraz 
[id number] To Whom It May Concern: 
 
Any Almaraz is currently under the care of [facility]. Admitted 9/13-9/15 He will return to work/school after evaluation with Cardiologist 
 
If there are questions or concerns please have the patient contact our office. Sincerely, Sue Mora PA-C No name on file.

## 2020-09-13 NOTE — ED PROVIDER NOTES
EMERGENCY DEPARTMENT HISTORY AND PHYSICAL EXAM      Date: 9/13/2020  Patient Name: Annmarie Olsen    History of Presenting Illness     Chief Complaint   Patient presents with    Palpitations       History Provided By: Patient    HPI: Annmarie Olsen, 64 y.o. male presents to the ED with cc of   Chief Complaint   Patient presents with    Palpitations     Patient states that he had an episode of palpitation with chest tightness and then he passed out while he was in the waiting room in the ER feels somewhat better now  Denies any fever denies any shortness of breath denies any exposure to COVID-19    There are no other complaints, changes, or physical findings at this time. PCP: Agnieszka Quezada MD    No current facility-administered medications on file prior to encounter. Current Outpatient Medications on File Prior to Encounter   Medication Sig Dispense Refill    dilTIAZem ER (TIAZAC) 240 mg capsule Take 240 mg by mouth daily.  metoprolol tartrate (LOPRESSOR) 50 mg tablet Take 50 mg by mouth daily.  metFORMIN (GLUCOPHAGE) 500 mg tablet Take 500 mg by mouth two (2) times daily (with meals).  vit B Cmplx 3-FA-Vit C-Biotin (NEPHRO PANFILO RX) 1- mg-mg-mcg tablet Take 1 Tab by mouth daily.  simvastatin (ZOCOR) 20 mg tablet Take 20 mg by mouth nightly.  aspirin (ASPIRIN) 325 mg tablet Take 325 mg by mouth daily.          Past History     Past Medical History:  Past Medical History:   Diagnosis Date    CAD (coronary artery disease)     Diabetes ([address]     Hypertension        Past Surgical History:  Past Surgical History:   Procedure Laterality Date    IR INJ SINUS TRACT THERAP  1998       Family History:  Family History   Problem Relation Age of Onset   [address] Hypertension Mother     Hypertension Father     Diabetes Father     Arrhythmia Sister     Hypertension Brother     Stroke Brother     Hypertension Sister        Social History:  Social History     Tobacco Use    Smoking status: Former Smoker     Types: Cigarettes     Last attempt to quit: 1/1/2010     Years since quitting: 10.7    Smokeless tobacco: Never Used   Substance Use Topics    Alcohol use: Yes     Alcohol/week: 2.5 - 3.3 standard drinks     Types: 3 - 4 Standard drinks or equivalent per week     Comment: occasional    Drug use: No       Allergies:  No Known Allergies      Review of Systems   Review of Systems   Constitutional: Negative. HENT: Negative for congestion, nosebleeds, sinus pressure and sinus pain. Eyes: Negative. Negative for photophobia. Respiratory: Negative for apnea, cough, choking, chest tightness, shortness of breath, wheezing and stridor. Cardiovascular: Positive for chest pain and palpitations. Gastrointestinal: Negative. Genitourinary: Negative. Musculoskeletal: Negative. Negative for back pain and gait problem. Skin: Negative. Allergic/Immunologic: Negative. Neurological: Positive for syncope. Negative for weakness, light-headedness and numbness. Hematological: Negative. Psychiatric/Behavioral: Negative. Physical Exam   Physical Exam  Vitals signs and nursing note reviewed. Constitutional:       Appearance: Normal appearance. He is normal weight. HENT:      Head: Normocephalic and atraumatic. Nose: No congestion or rhinorrhea. Eyes:      Extraocular Movements: Extraocular movements intact. Pupils: Pupils are equal, round, and reactive to light. Neck:      Musculoskeletal: Normal range of motion and neck supple. Cardiovascular:      Rate and Rhythm: Normal rate and regular rhythm. Pulmonary:      Effort: Pulmonary effort is normal.      Breath sounds: Normal breath sounds. Abdominal:      General: Abdomen is flat. Bowel sounds are normal. There is no distension. Tenderness: There is no abdominal tenderness. There is no guarding. Musculoskeletal: Normal range of motion. Skin:     General: Skin is warm and dry. Neurological:      General: No focal deficit present. Mental Status: He is alert and oriented to person, place, and time. Psychiatric:         Mood and Affect: Mood normal.         Behavior: Behavior normal.         Diagnostic Study Results     Labs -   No results found for this or any previous visit (from the past 12 hour(s)). Radiologic Studies -   XR CHEST PORT    (Results Pending)     CT Results  (Last 48 hours)    None        CXR Results  (Last 48 hours)               09/12/20 0630  XR CHEST PORT Final result    Impression:  Impression: Negative. Narrative:  Single AP upright view of the chest. No prior films are currently available for   comparison. Heart size is normal. Lungs are clear of infiltrate. No pulmonary   nodule or pleural effusion is seen. No bony abnormalities are identified. Radiology report and labs reviewed by me    Medical Decision Making   I am the first provider for this patient. I reviewed the vital signs, available nursing notes, past medical history, past surgical history, family history and social history. Vital Signs-Reviewed the patient's vital signs. Patient Vitals for the past 12 hrs:   Temp Pulse Resp BP SpO2   09/13/20 1254 98.2 °F (36.8 °C) 72 16 (!) 185/98 100 %       EKG interpretation: (PreEKG shows sinus rhythm with occasional PVCs moderate voltage criteria for LVH, ventricular rate of 82, left axis deviation with nonspecific ST changes        Records Reviewed: Nurse's note. Provider Notes (Medical Decision Making): Palpitation, coronary artery disease, unstable angina, arrhythmia, atrial fibrillation  Consult   consulted Della Conn MD cardiology on call for his cardiologist Dr. Merle Fenton, will see the patient in the ER    ED Course:   Initial assessment performed. The patients presenting problems have been discussed, and they are in agreement with the care plan formulated and outlined with them.   I have encouraged them to ask questions as they arise throughout their visit. ED Course as of Sep 13 1428   Sun Sep 13, 2020   0628 [address] [facility]       ED Course User Index  [KK] Sam Rosas MD       PROCEDURE    Critical Care Time:   CRITICAL CARE NOTE :      1:02 PM          [address], [place]      Disposition:  Admitted to DR Colleen Cruz  Diagnostic tests were reviewed and questions answered. Diagnosis, care plan and treatment options were discussed. The patient understand instructions and will follow up as directed. Condition stable        DISCHARGE PLAN:  1. Current Discharge Medication List        2. Follow-up Information    None       3. Return to ED if worse     Diagnosis     Clinical Impression:     ICD-10-CM ICD-9-CM    1. Near syncope  R55 780.2    2. Cardiac arrhythmia, unspecified cardiac arrhythmia type  I49.9 427.9    3. Essential hypertension  I10 401.9         Attestations:    Anthony Wong MD    Please note that this dictation was completed with [employer], the computer voice recognition software. Quite often unanticipated grammatical, syntax, homophones, and other interpretive errors are inadvertently transcribed by the computer software. Please disregard these errors. Please excuse any errors that have escaped final proofreading. Thank you.

## 2020-09-14 ENCOUNTER — APPOINTMENT (OUTPATIENT)
Dept: NON INVASIVE DIAGNOSTICS | Age: 60
End: 2020-09-14
Attending: PHYSICIAN ASSISTANT
Payer: COMMERCIAL

## 2020-09-14 LAB
ANION GAP SERPL CALC-SCNC: 8 MMOL/L (ref 5–15)
BASOPHILS # BLD: 0 K/UL (ref 0–0.1)
BASOPHILS NFR BLD: 1 % (ref 0–1)
BUN SERPL-MCNC: 8 MG/DL (ref 6–20)
BUN/CREAT SERPL: 7 (ref 12–20)
CA-I BLD-MCNC: 8.5 MG/DL (ref 8.5–10.1)
CHLORIDE SERPL-SCNC: 101 MMOL/L (ref 97–108)
CO2 SERPL-SCNC: 31 MMOL/L (ref 21–32)
CREAT SERPL-MCNC: 1.12 MG/DL (ref 0.7–1.3)
DIFFERENTIAL METHOD BLD: ABNORMAL
EOSINOPHIL # BLD: 0.2 K/UL (ref 0–0.4)
EOSINOPHIL NFR BLD: 3 % (ref 0–7)
ERYTHROCYTE [DISTWIDTH] IN BLOOD BY AUTOMATED COUNT: 14.1 % (ref 11.5–14.5)
GLUCOSE BLD STRIP.AUTO-MCNC: 110 MG/DL (ref 65–100)
GLUCOSE BLD STRIP.AUTO-MCNC: 131 MG/DL (ref 65–100)
GLUCOSE BLD STRIP.AUTO-MCNC: 132 MG/DL (ref 65–100)
GLUCOSE SERPL-MCNC: 103 MG/DL (ref 65–100)
HCT VFR BLD AUTO: 43 % (ref 36.6–50.3)
HGB BLD-MCNC: 15.5 % (ref 12.1–17)
IMM GRANULOCYTES # BLD AUTO: 0 K/UL (ref 0–0.04)
IMM GRANULOCYTES NFR BLD AUTO: 0 % (ref 0–0.5)
LYMPHOCYTES # BLD: 3.1 K/UL (ref 0.8–3.5)
LYMPHOCYTES NFR BLD: 48 % (ref 12–49)
MCH RBC QN AUTO: 35.7 PG (ref 26–34)
MCHC RBC AUTO-ENTMCNC: 36 G/DL (ref 30–36.5)
MCV RBC AUTO: 99.1 FL (ref 80–99)
MONOCYTES # BLD: 0.7 K/UL (ref 0–1)
MONOCYTES NFR BLD: 11 % (ref 5–13)
NEUTS SEG # BLD: 2.4 K/UL (ref 1.8–8)
NEUTS SEG NFR BLD: 37 % (ref 32–75)
PERFORMED BY, TECHID: ABNORMAL
PLATELET # BLD AUTO: 217 K/UL (ref 150–400)
PMV BLD AUTO: 11.8 FL (ref 8.9–12.9)
POTASSIUM SERPL-SCNC: 3.1 MMOL/L (ref 3.5–5.1)
RBC # BLD AUTO: 4.34 M/UL (ref 4.1–5.7)
SODIUM SERPL-SCNC: 140 MMOL/L (ref 136–145)
TROPONIN I SERPL-MCNC: <0.05 NG/ML
TROPONIN I SERPL-MCNC: <0.05 NG/ML
WBC # BLD AUTO: 6.4 K/UL (ref 4.1–11.1)

## 2020-09-14 PROCEDURE — 80048 BASIC METABOLIC PNL TOTAL CA: CPT

## 2020-09-14 PROCEDURE — 74011250636 HC RX REV CODE- 250/636: Performed by: PHYSICIAN ASSISTANT

## 2020-09-14 PROCEDURE — 96374 THER/PROPH/DIAG INJ IV PUSH: CPT

## 2020-09-14 PROCEDURE — 74011250637 HC RX REV CODE- 250/637: Performed by: PHYSICIAN ASSISTANT

## 2020-09-14 PROCEDURE — 96372 THER/PROPH/DIAG INJ SC/IM: CPT

## 2020-09-14 PROCEDURE — 82962 GLUCOSE BLOOD TEST: CPT

## 2020-09-14 PROCEDURE — 85025 COMPLETE CBC W/AUTO DIFF WBC: CPT

## 2020-09-14 PROCEDURE — 99218 HC RM OBSERVATION: CPT

## 2020-09-14 PROCEDURE — 96375 TX/PRO/DX INJ NEW DRUG ADDON: CPT

## 2020-09-14 PROCEDURE — 84484 ASSAY OF TROPONIN QUANT: CPT

## 2020-09-14 PROCEDURE — 74011250637 HC RX REV CODE- 250/637: Performed by: INTERNAL MEDICINE

## 2020-09-14 PROCEDURE — 93306 TTE W/DOPPLER COMPLETE: CPT

## 2020-09-14 PROCEDURE — 36415 COLL VENOUS BLD VENIPUNCTURE: CPT

## 2020-09-14 RX ORDER — LANOLIN ALCOHOL/MO/W.PET/CERES
400 CREAM (GRAM) TOPICAL DAILY
Qty: 30 TAB | Refills: 0 | Status: SHIPPED | OUTPATIENT
Start: 2020-09-14 | End: 2020-10-14

## 2020-09-14 RX ORDER — POTASSIUM CHLORIDE 1.5 G/1.77G
20 POWDER, FOR SOLUTION ORAL 2 TIMES DAILY WITH MEALS
Qty: 14 PACKET | Refills: 0 | Status: SHIPPED | OUTPATIENT
Start: 2020-09-14 | End: 2020-09-21

## 2020-09-14 RX ORDER — METOPROLOL TARTRATE 50 MG/1
50 TABLET ORAL 2 TIMES DAILY
Qty: 60 TAB | Refills: 1 | Status: SHIPPED | OUTPATIENT
Start: 2020-09-14 | End: 2020-10-14

## 2020-09-14 RX ORDER — LANOLIN ALCOHOL/MO/W.PET/CERES
400 CREAM (GRAM) TOPICAL DAILY
Status: DISCONTINUED | OUTPATIENT
Start: 2020-09-14 | End: 2020-09-15 | Stop reason: HOSPADM

## 2020-09-14 RX ADMIN — METFORMIN HYDROCHLORIDE 500 MG: 500 TABLET ORAL at 17:29

## 2020-09-14 RX ADMIN — METOPROLOL TARTRATE 50 MG: 50 TABLET, FILM COATED ORAL at 09:28

## 2020-09-14 RX ADMIN — Medication 10 ML: at 06:00

## 2020-09-14 RX ADMIN — FAMOTIDINE 20 MG: 20 TABLET ORAL at 17:29

## 2020-09-14 RX ADMIN — POTASSIUM CHLORIDE 20 MEQ: 1.5 FOR SOLUTION ORAL at 17:29

## 2020-09-14 RX ADMIN — Medication 10 ML: at 22:27

## 2020-09-14 RX ADMIN — METFORMIN HYDROCHLORIDE 500 MG: 500 TABLET ORAL at 09:27

## 2020-09-14 RX ADMIN — ASPIRIN 325 MG ORAL TABLET 325 MG: 325 PILL ORAL at 09:27

## 2020-09-14 RX ADMIN — ONDANSETRON 4 MG: 2 INJECTION INTRAMUSCULAR; INTRAVENOUS at 13:53

## 2020-09-14 RX ADMIN — METOPROLOL TARTRATE 50 MG: 50 TABLET, FILM COATED ORAL at 17:29

## 2020-09-14 RX ADMIN — FAMOTIDINE 20 MG: 20 TABLET ORAL at 09:27

## 2020-09-14 RX ADMIN — POTASSIUM CHLORIDE 20 MEQ: 1.5 FOR SOLUTION ORAL at 09:27

## 2020-09-14 RX ADMIN — SIMVASTATIN 20 MG: 10 TABLET, FILM COATED ORAL at 22:26

## 2020-09-14 RX ADMIN — HYDRALAZINE HYDROCHLORIDE 20 MG: 20 INJECTION INTRAMUSCULAR; INTRAVENOUS at 13:54

## 2020-09-14 RX ADMIN — Medication 10 ML: at 14:00

## 2020-09-14 RX ADMIN — DILTIAZEM HYDROCHLORIDE 240 MG: 120 CAPSULE, COATED, EXTENDED RELEASE ORAL at 09:27

## 2020-09-14 RX ADMIN — ENOXAPARIN SODIUM 40 MG: 40 INJECTION SUBCUTANEOUS at 09:28

## 2020-09-14 RX ADMIN — Medication 400 MG: at 09:27

## 2020-09-14 NOTE — PROGRESS NOTES
PT evaluation order received at pt admission. The patient screened by PT in room and was Independent with  all functional mobility and gait, no PT needs. Please consul physical therapy if any therapy needs arise. Thank you.

## 2020-09-14 NOTE — DISCHARGE SUMMARY
Hospitalist Discharge Summary     Patient ID:    Geoffrey Burgos  [id number]  29 y.o.  1960    Admit date: 9/13/2020    Discharge date : 9/14/2020    Chronic Diagnoses:    Problem List as of 9/14/2020 Date Reviewed: 12/28/2011          Codes Class Noted - Resolved    HTN (hypertension) ICD-10-CM: I10  ICD-9-CM: 401.9  9/13/2020 - Present        Diabetes mellitus type 2, controlled ([facility] ICD-10-CM: E11.9  ICD-9-CM: 250.00  9/13/2020 - Present        Syncopal episodes ICD-10-CM: R55  ICD-9-CM: 780.2  9/13/2020 - Present        * (Principal) Palpitations ICD-10-CM: R00.2  ICD-9-CM: 785.1  12/28/2011 - Present        Chest pain, unspecified ICD-10-CM: R07.9  ICD-9-CM: 786.50  12/28/2011 - Present          22    Final Diagnoses:   1. Palpitations with a history of proximal atrial fibrillation  2. Near syncopal episode  3. Hypertension  4. Type 2 diabetes  5. Hyperlipidemia  6. Symptomatic hypokalemia    Reason for Hospitalization: Near syncopal episode    Hospital Course: Patient is a 64 y.o. male who black male who presented to the ER on 9/13/2020 for palpitations. He said he has been having on and off palpitations for several weeks. He does have a history of atrial fibrillation. Not on anticoagulation. He said recently his primary care physician increased his dose of diltiazem and decreased his dose of metoprolol due to side effects. While he was in the waiting room he said he had a near syncopal episode where he got diaphoretic. He says he does have some chest pains when he is feeling the palpitations. Currently patient is chest pain-free. He did have an episode of diarrhea which she contributes it to the metformin. In the ED chest x-ray did not show any acute cardiopulmonary process. EKG showed normal sinus rhythm. Cardiology consulted increase patient's metoprolol to 50 mg twice daily. Laboratory data showed a potassium of 2.9. Most likely secondary to diarrhea.  Mag low at 1.4. These were supplemented. Cardiac telemetry views normal sinus rhythm with no arrhythmias. Possibly discharge later today if cleared by cardiology with close follow-up with his primary care physician and cardiology in 1 to 2 weeks    Discharge Medications:   Current Discharge Medication List      START taking these medications    Details   magnesium oxide (MAG-OX) 400 mg tablet Take 1 Tab by mouth daily for 30 days. Qty: 30 Tab, Refills: 0      potassium chloride (KLOR-CON) 20 mEq pack Take 1 Packet by mouth two (2) times daily (with meals) for 7 days. Qty: 14 Packet, Refills: 0         CONTINUE these medications which have CHANGED    Details   metoprolol tartrate (LOPRESSOR) 50 mg tablet Take 1 Tab by mouth two (2) times a day for 30 days. Qty: 60 Tab, Refills: 1         CONTINUE these medications which have NOT CHANGED    Details   dilTIAZem ER (TIAZAC) 240 mg capsule Take 240 mg by mouth daily. metFORMIN (GLUCOPHAGE) 500 mg tablet Take 500 mg by mouth two (2) times daily (with meals). vit B Cmplx 3-FA-Vit C-Biotin (NEPHRO PANFILO RX) 1- mg-mg-mcg tablet Take 1 Tab by mouth daily. simvastatin (ZOCOR) 20 mg tablet Take 20 mg by mouth nightly. aspirin (ASPIRIN) 325 mg tablet Take 325 mg by mouth daily. Follow up Care:    1. Vick Hernandez MD in 1-2 weeks. Follow-up Information     Follow up With Specialties Details Why Contact Info    Zaynab Horne MD Cardiology In 1 week  [address] [facility]      Vick Hernandez, [address] In 2 weeks  [address] [facility]  [address]  [phone]            Diet:  Cardiac Diet    Disposition:  Home. Patient Follow Up Instructions:    Activity: Activity as tolerated      Condition at Discharge:  Stable  __________________________________________________________________    Disposition  Home with family, no needs    ____________________________________________________________________    Code Status: Full Code  ___________________________________________________________________    Discharge Exam:  Patient seen and examined by me on discharge day. Pertinent Findings:  Gen:    Not in distress  Chest: Clear lungs  CVS:   Regular rhythm. No edema  Abd:  Soft, not distended, not tender  Neuro:  Alert    CONSULTATIONS: Cardiology    Significant Diagnostic Studies:   9/13/2020: BUN 7 mg/dL (Ref range: 6 - 20 mg/dL); Calcium 8.9 mg/dL (Ref range: 8.5 - 10.1 mg/dL); CO2 30 mmol/L (Ref range: 21 - 32 mmol/L); Creatinine 1.23 mg/dL (Ref range: 0.70 - 1.30 mg/dL); Glucose 125 mg/dL* (Ref range: 65 - 100 mg/dL); HCT 48.8 % (Ref range: 36.6 - 50.3 %); HGB 17.7 %* (Ref range: 12.1 - 17.0 %); Potassium 3.8 mmol/L (Ref range: 3.5 - 5.1 mmol/L); Sodium 138 mmol/L (Ref range: 136 - 145 mmol/L)  9/14/2020: BUN 8 mg/dL (Ref range: 6 - 20 mg/dL); Calcium 8.5 mg/dL (Ref range: 8.5 - 10.1 mg/dL); CO2 31 mmol/L (Ref range: 21 - 32 mmol/L); Creatinine 1.12 mg/dL (Ref range: 0.70 - 1.30 mg/dL); Glucose 103 mg/dL* (Ref range: 65 - 100 mg/dL); HCT 43.0 % (Ref range: 36.6 - 50.3 %); HGB 15.5 % (Ref range: 12.1 - 17.0 %); Potassium 3.1 mmol/L* (Ref range: 3.5 - 5.1 mmol/L); Sodium 140 mmol/L (Ref range: 136 - 145 mmol/L)  Recent Labs     09/14/20  0436 09/13/20  1330   WBC 6.4 9.1   HGB 15.5 17.7*   HCT 43.0 48.8    270     Recent Labs     09/14/20  0436 09/13/20  1330 09/12/20  0910    138 138   K 3.1* 3.8 2.9*    101 98   CO2 31 30 29   BUN 8 7 5*   CREA 1.12 1.23 0.99   * 125* 132*   CA 8.5 8.9 9.2   MG  --  1.4*  --      Recent Labs     09/12/20  0910   *   AP 83   TBILI 0.9   TP 8.5*   ALB 3.7   GLOB 4.8*     No results for input(s): INR, PTP, APTT, INREXT in the last 72 hours. No results for input(s): FE, TIBC, PSAT, FERR in the last 72 hours.    No results for input(s): PH, PCO2, PO2 in the last 72 hours. No results for input(s): CPK, CKMB in the last 72 hours. No lab exists for component: TROPONINI  Lab Results   Component Value Date/Time    Glucose (POC) 124 (H) 09/13/2020 09:14 PM           Signed:  Baldev De Paz PA-C  9/14/2020  7:31 AM      Cardiology elevated the patient this morning and he started to not feel well. Said he was getting diaphoretic and that he most had to pass out. Therefore they would like to get a 2D echo with possible stress test in the a.m.   Discharge canceled

## 2020-09-14 NOTE — PROGRESS NOTES
Cardiology Progress Note  2020     Admit Date: 2020  Admit Diagnosis: Syncopal episodes [R55]  Palpitations [R00.2]  CC: none currently  Cardiac Assessment/Plan:     1. Continue telemetry monitoring  2. Obtain serial cardiac enzymes  3. Obtain complete 2D echocardiogram  4. Consider myocardial perfusion imaging    CAD:     HTN:     Rhythm:     Volume status:euvolemic  Renal function: stable   For other plans, see orders. Hospital problem list     Active Hospital Problems    Diagnosis Date Noted    HTN (hypertension) 2020    Diabetes mellitus type 2, controlled ([address] 2020    Syncopal episodes 2020    Palpitations 2011        Subjective: Neeraj Arias reports       The patient describes chest discomfort. He also describes a sensation of rapid heart rate. Further notes diaphoresis. Ambulated in hallway without symptoms   Yes   Ambulated in room without symptoms  Yes   Objective:     Physical Exam:  Overall VSSAF;    Visit Vitals  BP (!) 160/98   Pulse 82   Temp 98.2 °F (36.8 °C)   Resp 16   Ht 5' 6.93\" (1.7 m)   Wt 90.7 kg (200 lb)   SpO2 100%   BMI 31.39 kg/m²     Temp (24hrs), Av.2 °F (36.8 °C), Min:97.8 °F (36.6 °C), Max:98.3 °F (36.8 °C)    Patient Vitals for the past 8 hrs:   Pulse   20 1600 82   20 1519 81   20 1354 69   20 1200 69     No data found. Patient Vitals for the past 8 hrs:   BP   20 1458 (!) 160/98   20 1354 (!) 160/98     No intake or output data in the 24 hours ending 20 1650  General Appearance: Well developed, well nourished, no acute distress. Ears/Nose/Mouth/Throat:   Normal MM; anicteric. JVP: WNL   Resp:   Lungs clear to auscultation bilaterally. Nl resp effort. Cardiovascular:  RRR, S1, S2 normal, no new murmur. No gallop or rub. Abdomen:   Soft, non-tender, bowel sounds are present. Extremities: No edema bilaterally. Skin:  Neuro: Warm and dry.   A/O x3, grossly nonfocal       cath site intact w/o hematoma or new bruit; distal pulse unchanged  Yes   Data Review:     Telemetry independently reviewed  sinus  chronic afib  parox afib  NSVT     ECG independently reviewed  NSR  afib  no significant changes  NSST-Tw chgs      no new ECG provided for review   Lab results reviewed as noted below. Current medications reviewed as noted below. No results for input(s): PH, PCO2, PO2 in the last 72 hours. Recent Labs     09/14/20  1352 09/13/20  1330 09/12/20  0910   TROIQ <0.05 <0.05 <0.05     Recent Labs     09/14/20  0436 09/13/20  1330 09/12/20  0910 09/12/20  0557    138 138  --    K 3.1* 3.8 2.9*  --     101 98  --    CO2 31 30 29  --    BUN 8 7 5*  --    CREA 1.12 1.23 0.99  --    GFRAA >60 >60 >60  --    * 125* 132*  --    CA 8.5 8.9 9.2  --    ALB  --   --  3.7  --    WBC 6.4 9.1  --  5.6   HGB 15.5 17.7*  --  15.3   HCT 43.0 48.8  --  43.1    270  --  253     No results found for: CHOL, CHOLX, CHLST, CHOLV, HDL, HDLP, LDL, LDLC, DLDLP, TGLX, TRIGL, TRIGP, CHHD, CHHDX  Recent Labs     09/12/20  0910   AP 83   TP 8.5*   ALB 3.7   GLOB 4.8*     No results for input(s): INR, PTP, APTT, INREXT in the last 72 hours.    No components found for: [place]    Current Facility-Administered Medications   Medication Dose Route Frequency    magnesium oxide (MAG-OX) tablet 400 mg  400 mg Oral DAILY    potassium chloride (KLOR-CON) packet for solution 20 mEq  20 mEq Oral BID WITH MEALS    metoprolol tartrate (LOPRESSOR) tablet 50 mg  50 mg Oral BID    aspirin tablet 325 mg  325 mg Oral DAILY    dilTIAZem ER (CARDIZEM CD) capsule 240 mg  240 mg Oral DAILY    metFORMIN (GLUCOPHAGE) tablet 500 mg  500 mg Oral BID WITH MEALS    simvastatin (ZOCOR) tablet 20 mg  20 mg Oral QHS    sodium chloride (NS) flush 5-40 mL  5-40 mL IntraVENous Q8H    sodium chloride (NS) flush 5-40 mL  5-40 mL IntraVENous PRN    acetaminophen (TYLENOL) tablet 650 mg  650 mg Oral Q6H PRN    Or    acetaminophen (TYLENOL) suppository 650 mg  650 mg Rectal Q6H PRN    polyethylene glycol (MIRALAX) packet 17 g  17 g Oral DAILY PRN    ondansetron (ZOFRAN ODT) tablet 4 mg  4 mg Oral Q8H PRN    Or    ondansetron (ZOFRAN) injection 4 mg  4 mg IntraVENous Q6H PRN    famotidine (PEPCID) tablet 20 mg  20 mg Oral BID    enoxaparin (LOVENOX) injection 40 mg  40 mg SubCUTAneous DAILY    glucose chewable tablet 16 g  4 Tab Oral PRN    dextrose (D50W) injection syrg 12.5-25 g  25-50 mL IntraVENous PRN    glucagon (GLUCAGEN) injection 1 mg  1 mg IntraMUSCular PRN    insulin lispro (HUMALOG) injection   SubCUTAneous QID    hydrALAZINE (APRESOLINE) 20 mg/mL injection 20 mg  20 mg IntraVENous Q6H PRN        Zena Fabian MD                          Cardiology Progress Note  9/14/2020     Admit Date: 9/13/2020  Admit Diagnosis: Syncopal episodes [R55]  Palpitations [R00.2]  CC: none currently  Cardiac Assessment/Plan:     CAD:     HTN:     Rhythm:     Volume status:euvolemic  Renal function: stable   For other plans, see orders.   Hospital problem list     Active Hospital Problems    Diagnosis Date Noted    HTN (hypertension) 09/13/2020    [facility] 09/13/2020    Syncopal episodes 09/13/2020    Palpitations 12/28/2011        Subjective: Devika Rivas reports       Chest pain X none  consistent with:  Non-cardiac CP         Atypical CP     None now  On going  Anginal CP     Dyspnea X none  at rest  with exertion         improved  unchanged  worse              PND X none  overnight       Orthopnea X none  improved  unchanged  worse   Presyncope X none  improved  unchanged  worse     Ambulated in hallway without symptoms   Yes   Ambulated in room without symptoms  Yes   Objective:     Physical Exam:  Overall VSSAF;    Visit Vitals  BP (!) 160/98   Pulse 82   Temp 98.2 °F (36.8 °C)   Resp 16   Ht 5' 6.93\" (1.7 m)   Wt 90.7 kg (200 lb)   SpO2 100%   BMI 31.39 kg/m²     Temp (24hrs), Av.2 °F (36.8 °C), Min:97.8 °F (36.6 °C), Max:98.3 °F (36.8 °C)    Patient Vitals for the past 8 hrs:   Pulse   20 1600 82   20 1519 81   20 1354 69   20 1200 69     No data found. Patient Vitals for the past 8 hrs:   BP   20 1458 (!) 160/98   20 1354 (!) 160/98     No intake or output data in the 24 hours ending 20 1650  General Appearance: Well developed, well nourished, no acute distress. Ears/Nose/Mouth/Throat:   Normal MM; anicteric. JVP: WNL   Resp:   Lungs clear to auscultation bilaterally. Nl resp effort. Cardiovascular:  RRR, S1, S2 normal, no new murmur. No gallop or rub. Abdomen:   Soft, non-tender, bowel sounds are present. Extremities: No edema bilaterally. Skin:  Neuro: Warm and dry. A/O x3, grossly nonfocal       cath site intact w/o hematoma or new bruit; distal pulse unchanged  Yes   Data Review:     Telemetry independently reviewed  sinus  chronic afib  parox afib  NSVT     ECG independently reviewed  NSR  afib  no significant changes  NSST-Tw chgs      no new ECG provided for review   Lab results reviewed as noted below. Current medications reviewed as noted below. No results for input(s): PH, PCO2, PO2 in the last 72 hours.   Recent Labs     20  1352 20  1330 20  0910   TROIQ <0.05 <0.05 <0.05     Recent Labs     20  0436 20  1330 20  0910 20  0557    138 138  --    K 3.1* 3.8 2.9*  --     101 98  --    CO2 31 30 29  --    BUN 8 7 5*  --    CREA 1.12 1.23 0.99  --    GFRAA >60 >60 >60  --    * 125* 132*  --    CA 8.5 8.9 9.2  --    ALB  --   --  3.7  --    WBC 6.4 9.1  --  5.6   HGB 15.5 17.7*  --  15.3   HCT 43.0 48.8  --  43.1    270  --  253     No results found for: CHOL, CHOLX, CHLST, CHOLV, HDL, HDLP, LDL, LDLC, DLDLP, TGLX, TRIGL, TRIGP, CHHD, CHHDX  Recent Labs     20  0910   AP 83   TP 8.5*   ALB 3.7   GLOB 4.8*     No results for input(s): INR, PTP, APTT, INREXT in the last 72 hours.    No components found for: [place]    Current Facility-Administered Medications   Medication Dose Route Frequency    magnesium oxide (MAG-OX) tablet 400 mg  400 mg Oral DAILY    potassium chloride (KLOR-CON) packet for solution 20 mEq  20 mEq Oral BID WITH MEALS    metoprolol tartrate (LOPRESSOR) tablet 50 mg  50 mg Oral BID    aspirin tablet 325 mg  325 mg Oral DAILY    dilTIAZem ER (CARDIZEM CD) capsule 240 mg  240 mg Oral DAILY    metFORMIN (GLUCOPHAGE) tablet 500 mg  500 mg Oral BID WITH MEALS    simvastatin (ZOCOR) tablet 20 mg  20 mg Oral QHS    sodium chloride (NS) flush 5-40 mL  5-40 mL IntraVENous Q8H    sodium chloride (NS) flush 5-40 mL  5-40 mL IntraVENous PRN    acetaminophen (TYLENOL) tablet 650 mg  650 mg Oral Q6H PRN    Or    acetaminophen (TYLENOL) suppository 650 mg  650 mg Rectal Q6H PRN    polyethylene glycol (MIRALAX) packet 17 g  17 g Oral DAILY PRN    ondansetron (ZOFRAN ODT) tablet 4 mg  4 mg Oral Q8H PRN    Or    ondansetron (ZOFRAN) injection 4 mg  4 mg IntraVENous Q6H PRN    famotidine (PEPCID) tablet 20 mg  20 mg Oral BID    enoxaparin (LOVENOX) injection 40 mg  40 mg SubCUTAneous DAILY    glucose chewable tablet 16 g  4 Tab Oral PRN    dextrose (D50W) injection syrg 12.5-25 g  25-50 mL IntraVENous PRN    glucagon (GLUCAGEN) injection 1 mg  1 mg IntraMUSCular PRN    insulin lispro (HUMALOG) injection   SubCUTAneous QID    hydrALAZINE (APRESOLINE) 20 mg/mL injection 20 mg  20 mg IntraVENous Q6H PRN        Gilford Devon, MD

## 2020-09-14 NOTE — PROGRESS NOTES
Occupational Therapy Screening:  Services are not indicated at this time. OT evaluation order received at pt admission. The patient screened by OT in room and was Independent with  all functional ADL mobility and self care, no OT needs. Please consult occupational therapy if any therapy needs arise. Thank you.     Edi Armenta

## 2020-09-14 NOTE — PROGRESS NOTES
Primary Nurse Cathy Navarro RN and  Lois Champion RN performed a dual skin assessment on this patient. No issues found.

## 2020-09-15 VITALS
RESPIRATION RATE: 18 BRPM | OXYGEN SATURATION: 97 % | SYSTOLIC BLOOD PRESSURE: 152 MMHG | TEMPERATURE: 97.7 F | HEIGHT: 67 IN | WEIGHT: 200 LBS | HEART RATE: 100 BPM | DIASTOLIC BLOOD PRESSURE: 95 MMHG | BODY MASS INDEX: 31.39 KG/M2

## 2020-09-15 PROBLEM — E87.6 HYPOKALEMIA: Status: ACTIVE | Noted: 2020-09-15

## 2020-09-15 LAB
ANION GAP SERPL CALC-SCNC: 3 MMOL/L (ref 5–15)
ATRIAL RATE: 67 BPM
ATRIAL RATE: 82 BPM
BASOPHILS # BLD: 0 K/UL (ref 0–0.1)
BASOPHILS NFR BLD: 1 % (ref 0–1)
BUN SERPL-MCNC: 9 MG/DL (ref 6–20)
BUN/CREAT SERPL: 8 (ref 12–20)
CA-I BLD-MCNC: 9 MG/DL (ref 8.5–10.1)
CALCULATED P AXIS, ECG09: 53 DEGREES
CALCULATED P AXIS, ECG09: 61 DEGREES
CALCULATED R AXIS, ECG10: -3 DEGREES
CALCULATED R AXIS, ECG10: -5 DEGREES
CALCULATED T AXIS, ECG11: -16 DEGREES
CALCULATED T AXIS, ECG11: 12 DEGREES
CHLORIDE SERPL-SCNC: 104 MMOL/L (ref 97–108)
CO2 SERPL-SCNC: 31 MMOL/L (ref 21–32)
CREAT SERPL-MCNC: 1.11 MG/DL (ref 0.7–1.3)
DIAGNOSIS, 93000: NORMAL
DIAGNOSIS, 93000: NORMAL
DIFFERENTIAL METHOD BLD: ABNORMAL
EOSINOPHIL # BLD: 0.1 K/UL (ref 0–0.4)
EOSINOPHIL NFR BLD: 2 % (ref 0–7)
ERYTHROCYTE [DISTWIDTH] IN BLOOD BY AUTOMATED COUNT: 14.2 % (ref 11.5–14.5)
GLUCOSE BLD STRIP.AUTO-MCNC: 112 MG/DL (ref 65–100)
GLUCOSE SERPL-MCNC: 101 MG/DL (ref 65–100)
HCT VFR BLD AUTO: 43.5 % (ref 36.6–50.3)
HGB BLD-MCNC: 15.3 % (ref 12.1–17)
IMM GRANULOCYTES # BLD AUTO: 0 K/UL (ref 0–0.04)
IMM GRANULOCYTES NFR BLD AUTO: 0 % (ref 0–0.5)
LYMPHOCYTES # BLD: 2.7 K/UL (ref 0.8–3.5)
LYMPHOCYTES NFR BLD: 42 % (ref 12–49)
MCH RBC QN AUTO: 35.3 PG (ref 26–34)
MCHC RBC AUTO-ENTMCNC: 35.2 G/DL (ref 30–36.5)
MCV RBC AUTO: 100.5 FL (ref 80–99)
MONOCYTES # BLD: 0.8 K/UL (ref 0–1)
MONOCYTES NFR BLD: 12 % (ref 5–13)
NEUTS SEG # BLD: 2.8 K/UL (ref 1.8–8)
NEUTS SEG NFR BLD: 43 % (ref 32–75)
P-R INTERVAL, ECG05: 192 MS
P-R INTERVAL, ECG05: 202 MS
PERFORMED BY, TECHID: ABNORMAL
PLATELET # BLD AUTO: 230 K/UL (ref 150–400)
PMV BLD AUTO: 12 FL (ref 8.9–12.9)
POTASSIUM SERPL-SCNC: 3.3 MMOL/L (ref 3.5–5.1)
Q-T INTERVAL, ECG07: 376 MS
Q-T INTERVAL, ECG07: 418 MS
QRS DURATION, ECG06: 84 MS
QRS DURATION, ECG06: 86 MS
QTC CALCULATION (BEZET), ECG08: 439 MS
QTC CALCULATION (BEZET), ECG08: 441 MS
RBC # BLD AUTO: 4.33 M/UL (ref 4.1–5.7)
SODIUM SERPL-SCNC: 138 MMOL/L (ref 136–145)
TROPONIN I SERPL-MCNC: <0.05 NG/ML
VENTRICULAR RATE, ECG03: 67 BPM
VENTRICULAR RATE, ECG03: 82 BPM
WBC # BLD AUTO: 6.5 K/UL (ref 4.1–11.1)

## 2020-09-15 PROCEDURE — 82962 GLUCOSE BLOOD TEST: CPT

## 2020-09-15 PROCEDURE — 99218 HC RM OBSERVATION: CPT

## 2020-09-15 PROCEDURE — 74011250637 HC RX REV CODE- 250/637: Performed by: PHYSICIAN ASSISTANT

## 2020-09-15 PROCEDURE — 96372 THER/PROPH/DIAG INJ SC/IM: CPT

## 2020-09-15 PROCEDURE — 74011250637 HC RX REV CODE- 250/637: Performed by: INTERNAL MEDICINE

## 2020-09-15 PROCEDURE — 36415 COLL VENOUS BLD VENIPUNCTURE: CPT

## 2020-09-15 PROCEDURE — 84484 ASSAY OF TROPONIN QUANT: CPT

## 2020-09-15 PROCEDURE — 80048 BASIC METABOLIC PNL TOTAL CA: CPT

## 2020-09-15 PROCEDURE — 85025 COMPLETE CBC W/AUTO DIFF WBC: CPT

## 2020-09-15 PROCEDURE — 74011250636 HC RX REV CODE- 250/636: Performed by: PHYSICIAN ASSISTANT

## 2020-09-15 RX ADMIN — METFORMIN HYDROCHLORIDE 500 MG: 500 TABLET ORAL at 08:37

## 2020-09-15 RX ADMIN — ASPIRIN 325 MG ORAL TABLET 325 MG: 325 PILL ORAL at 08:37

## 2020-09-15 RX ADMIN — FAMOTIDINE 20 MG: 20 TABLET ORAL at 08:37

## 2020-09-15 RX ADMIN — POTASSIUM CHLORIDE 20 MEQ: 1.5 FOR SOLUTION ORAL at 08:37

## 2020-09-15 RX ADMIN — DILTIAZEM HYDROCHLORIDE 240 MG: 120 CAPSULE, COATED, EXTENDED RELEASE ORAL at 08:37

## 2020-09-15 RX ADMIN — ENOXAPARIN SODIUM 40 MG: 40 INJECTION SUBCUTANEOUS at 08:37

## 2020-09-15 RX ADMIN — Medication 400 MG: at 08:37

## 2020-09-15 RX ADMIN — Medication 10 ML: at 06:05

## 2020-09-15 RX ADMIN — METOPROLOL TARTRATE 50 MG: 50 TABLET, FILM COATED ORAL at 08:37

## 2020-09-15 NOTE — DISCHARGE SUMMARY
Admit date: 9/13/2020   Admitting Provider: Kaila Villalpando MD    Discharge date: 9/15/2020  Discharging Provider: Faina Maciel PA-C      * Admission Diagnoses: Syncopal episodes [R55]  Palpitations [R00.2]    * Discharge Diagnoses:    Hospital Problems as of 9/15/2020 Date Reviewed: 12/28/2011          Codes Class Noted - Resolved POA    Hypokalemia ICD-10-CM: E87.6  ICD-9-CM: 276.8  9/15/2020 - Present Unknown        HTN (hypertension) ICD-10-CM: I10  ICD-9-CM: 401.9  9/13/2020 - Present Unknown        Diabetes mellitus type 2, controlled ([facility] [address] ICD-10-CM: E11.9  ICD-9-CM: 250.00  9/13/2020 - Present Unknown        Syncopal episodes ICD-10-CM: R55  ICD-9-CM: 780.2  9/13/2020 - Present Unknown        * (Principal) Palpitations ICD-10-CM: R00.2  ICD-9-CM: 785.1  12/28/2011 - Present Yes            Recent Results (from the past 24 hour(s))   GLUCOSE, POC    Collection Time: 09/14/20  9:35 AM   Result Value Ref Range    Glucose (POC) 131 (H) 65 - 100 mg/dL    Performed by Megan Cooper    GLUCOSE, POC    Collection Time: 09/14/20 11:08 AM   Result Value Ref Range    Glucose (POC) 132 (H) 65 - 100 mg/dL    Performed by Megan Cooper    TROPONIN I    Collection Time: 09/14/20  1:52 PM   Result Value Ref Range    Troponin-I, Qt. <0.05 <0.05 ng/mL   ECHO ADULT COMPLETE    Collection Time: 09/14/20  3:59 PM   Result Value Ref Range    Aortic Valve Systolic Peak Velocity 399.69 cm/s    Pulmonic Regurgitant End Max Velocity 128.00 cm/s    AoV PG 7.00 mmHg    Ao Root D 3.30 cm    IVSd 0.86 0.6 - 1.0 cm    LVIDd 4.14 (A) 4.2 - 5.9 cm    LVIDs 2.72 cm    LVOT d 2.00 cm    LVOT Peak Velocity 120.00 cm/s    Pulmonic Regurgitant End Max Velocity 120.00 cm/s    LVOT Peak Gradient 6.00 mmHg    LVPWd 0.77 0.6 - 1.0 cm    LV E' Septal Velocity 6.14 cm/s    Mitral Valve E Wave Deceleration Time 0.19 s    MV A Gurpreet 74.00 cm/s    MV E Gurpreet 63.50 cm/s    Pulmonic Valve Max Velocity 75.40 cm/s    Pulmonic Regurgitant End Max Velocity 75.40 cm/s    Pulmonic Valve Systolic Peak Instantaneous Gradient 2.00 mmHg    P Vein A Dur 0.10 s    Pulmonary Vein \"A\" Wave Velocity 24.70 cm/s    Est. RA Pressure 3.00 mmHg    RVIDd 3.21 cm    RVSP 13.00 mmHg    Tricuspid Valve Max Velocity 158.00 cm/s    Triscuspid Valve Regurgitation Peak Gradient 10.00 mmHg    MV E/A 0.86     LV Mass .4 88 - 224 g    LV Mass AL Index 50.2 49 - 115 g/m2    E/E' septal 10.34    TROPONIN I    Collection Time: 09/14/20  6:00 PM   Result Value Ref Range    Troponin-I, Qt. <0.05 <0.05 ng/mL   GLUCOSE, POC    Collection Time: 09/14/20  9:09 PM   Result Value Ref Range    Glucose (POC) 110 (H) 65 - 100 mg/dL    Performed by [address] [place] [address], [facility]    Collection Time: 09/15/20  4:40 AM   Result Value Ref Range    Sodium 138 136 - 145 mmol/L    Potassium 3.3 (L) 3.5 - 5.1 mmol/L    Chloride 104 97 - 108 mmol/L    CO2 31 21 - 32 mmol/L    Anion gap 3 (L) 5 - 15 mmol/L    Glucose 101 (H) 65 - 100 mg/dL    BUN 9 6 - 20 mg/dL    Creatinine 1.11 0.70 - 1.30 mg/dL    BUN/Creatinine ratio 8 (L) 12 - 20      GFR est AA >60 >60 ml/min/1.73m2    GFR est non-AA >60 >60 ml/min/1.73m2    Calcium 9.0 8.5 - 10.1 mg/dL   TROPONIN I    Collection Time: 09/15/20  4:40 AM   Result Value Ref Range    Troponin-I, Qt. <0.05 <0.05 ng/mL   GLUCOSE, POC    Collection Time: 09/15/20  8:09 AM   Result Value Ref Range    Glucose (POC) 112 (H) 65 - 100 mg/dL    Performed by OSCAR VASQUEZ      Chest x-ray on September 14: IMPRESSION  Impression: No acute abnormality demonstrated, unchanged. [facility] Course:   Hospital Course: Patient is a 64 y.o. male who black male who presented to the ER on 9/13/2020 for palpitations. He said he has been having on and off palpitations for several weeks. He does have a history of atrial fibrillation. Not on anticoagulation.   He said recently his primary care physician increased his dose of diltiazem and decreased his dose of metoprolol due to side effects. While he was in the waiting room he said he had a near syncopal episode where he got diaphoretic. He says he does have some chest pains when he is feeling the palpitations. Currently patient is chest pain-free. He did have an episode of diarrhea which she contributes it to the metformin. In the ED chest x-ray did not show any acute cardiopulmonary process. EKG showed normal sinus rhythm. Then tried to click conservatively talk again. Metoprolol was discontinued while in the hospital due to bradycardia. Laboratory data showed a potassium of 2.9. Most likely secondary to diarrhea. Mag low at 1.4. These were supplemented. Cardiac telemetry views normal sinus rhythm with no arrhythmias. Possibly discharge later today if cleared by cardiology with close follow-up with his primary care physician and cardiology in 1 to 2 weeks. Dr. Adela Yuan evaluation echocardiogram reveals an EF of 65% and patient will follow-up in 1 week in the office for a stress test.  He will also need a BMP at that time. He will discontinue the metoprolol till that visit. New prescriptions to include magnesium oxide and potassium.       Consults: Cardiology    Significant Diagnostic Studies: Echocardiogram with primarily from Dr. Gianfranco Sánchez revealing EF of 65% with no structural abnormalities. No diastolic dysfunction noted. Discharge Exam:  Visit Vitals  BP (!) 152/95   Pulse 100   Temp 97.7 °F (36.5 °C)   Resp 18   Ht 5' 6.93\" (1.7 m)   Wt 90.7 kg (200 lb)   SpO2 97%   BMI 31.39 kg/m²     General:  Alert, cooperative, no distress, appears stated age. Head:  Normocephalic, without obvious abnormality, atraumatic. Eyes:  Conjunctivae/corneas clear. PERRL, EOMs intact. Fundi benign   Ears:  Normal TMs and external ear canals both ears. Nose: Nares normal. Septum midline. Mucosa normal. No drainage or sinus tenderness.    Throat: Lips, mucosa, and tongue normal. Teeth and gums normal.   Neck: Supple, symmetrical, trachea midline, no adenopathy, thyroid: no enlargement/tenderness/nodules, no carotid bruit and no JVD. Back:   Symmetric, no curvature. ROM normal. No CVA tenderness. Lungs:   Clear to auscultation bilaterally. Chest wall:  No tenderness or deformity. Heart:  Regular rate and rhythm, S1, S2 normal, no murmur, click, rub or gallop. Abdomen:   Soft, non-tender. Bowel sounds normal. No masses,  No organomegaly. Genitalia:  Normal male without lesion, discharge or tenderness. Rectal:  Normal tone, normal prostate, no masses or tenderness  Guaiac negative stool. Extremities: Extremities normal, atraumatic, no cyanosis or edema. Pulses: 2+ and symmetric all extremities. Skin: Skin color, texture, turgor normal. No rashes or lesions   Lymph nodes: Cervical, supraclavicular, and axillary nodes normal.   Neurologic: CNII-XII intact. Normal strength, sensation and reflexes throughout. * Discharge Condition: stable  * Disposition: Home    Discharge Medications:  Current Discharge Medication List      START taking these medications    Details   magnesium oxide (MAG-OX) 400 mg tablet Take 1 Tab by mouth daily for 30 days. Qty: 30 Tab, Refills: 0      potassium chloride (KLOR-CON) 20 mEq pack Take 1 Packet by mouth two (2) times daily (with meals) for 7 days. Qty: 14 Packet, Refills: 0         CONTINUE these medications which have CHANGED    Details   metoprolol tartrate (LOPRESSOR) 50 mg tablet Take 1 Tab by mouth two (2) times a day for 30 days. Qty: 60 Tab, Refills: 1         CONTINUE these medications which have NOT CHANGED    Details   dilTIAZem ER (TIAZAC) 240 mg capsule Take 240 mg by mouth daily. metFORMIN (GLUCOPHAGE) 500 mg tablet Take 500 mg by mouth two (2) times daily (with meals). vit B Cmplx 3-FA-Vit C-Biotin (NEPHRO PANFILO RX) 1- mg-mg-mcg tablet Take 1 Tab by mouth daily. simvastatin (ZOCOR) 20 mg tablet Take 20 mg by mouth nightly.       aspirin (ASPIRIN) 325 mg tablet Take 325 mg by mouth daily. * Follow-up Care/Patient Instructions:   Activity: Activity as tolerated  Diet: Diabetic Diet  Wound Care: None needed    Follow-up Information     Follow up With Specialties Details Why Contact Info    Janina Thomas MD Cardiology In 1 week  [facility] [address]  [place] [address] [facility], [address] In 2 weeks  [address] [facility]  Formerly [facility]  [phone]            Signed:  Swetha Paez PA-C  9/15/2020  9:22 AM

## 2020-09-15 NOTE — PROGRESS NOTES
Cardiology Progress Note  9/15/2020     Admit Date: 2020  Admit Diagnosis: Syncopal episodes [R55]  Palpitations [R00.2]  CC: none currently  Cardiac Assessment/Plan:     ASSESSMENT:  1.  Near syncope  2. Hypertension  3. Paroxysmal atrial fibrillation, currently in sinus rhythm    PLAN:  1. Discharge home  2. Continue current cardiovascular medications  3. Consider outpatient stress test    The patient is seen and examined in room 203. There were no acute cardiovascular events reported overnight. Currently, he denies any cardiovascular complaints. Specifically, he denies chest pain or shortness of breath. For other plans, see orders.   Hospital problem list     Active Hospital Problems    Diagnosis Date Noted    HTN (hypertension) 2020    Diabetes mellitus type 2, controlled ([facility] [address] 2020    Syncopal episodes 2020    Palpitations 2011        Subjective: Efe Boykin reports       Chest pain X none  consistent with:  Non-cardiac CP         Atypical CP     None now  On going  Anginal CP     Dyspnea X none  at rest  with exertion         improved  unchanged  worse              PND X none  overnight       Orthopnea X none  improved  unchanged  worse   Presyncope X none  improved  unchanged  worse     Ambulated in hallway without symptoms   Yes   Ambulated in room without symptoms  Yes   Objective:     Physical Exam:  Overall VSSAF;    Visit Vitals  BP (!) 143/87   Pulse 65   Temp 98.1 °F (36.7 °C)   Resp 16   Ht 5' 6.93\" (1.7 m)   Wt 90.7 kg (200 lb)   SpO2 99%   BMI 31.39 kg/m²     Temp (24hrs), Av.1 °F (36.7 °C), Min:97.8 °F (36.6 °C), Max:98.2 °F (36.8 °C)    Patient Vitals for the past 8 hrs:   Pulse   09/15/20 0252 65     Patient Vitals for the past 8 hrs:   Resp   09/15/20 0252 16     Patient Vitals for the past 8 hrs:   BP   09/15/20 0252 (!) 143/87     No intake or output data in the 24 hours ending 09/15/20 0641  General Appearance: Well developed, well nourished, no acute distress. Ears/Nose/Mouth/Throat:   Normal MM; anicteric. JVP: WNL   Resp:   Lungs clear to auscultation bilaterally. Nl resp effort. Cardiovascular:   JVP is not elevated, PMI is not palpable, normal in his S1 and S2, without S3   Abdomen:   Soft, non-tender, bowel sounds are present. Extremities: No edema bilaterally. Skin:  Neuro: Warm and dry. A/O x3, grossly nonfocal        no new ECG provided for review   Lab results reviewed as noted below. Current medications reviewed as noted below. No results for input(s): PH, PCO2, PO2 in the last 72 hours. Recent Labs     09/14/20  1800 09/14/20  1352 09/13/20  1330   TROIQ <0.05 <0.05 <0.05     Recent Labs     09/14/20  0436 09/13/20  1330 09/12/20  0910    138 138   K 3.1* 3.8 2.9*    101 98   CO2 31 30 29   BUN 8 7 5*   CREA 1.12 1.23 0.99   GFRAA >60 >60 >60   * 125* 132*   CA 8.5 8.9 9.2   ALB  --   --  3.7   WBC 6.4 9.1  --    HGB 15.5 17.7*  --    HCT 43.0 48.8  --     270  --      No results found for: CHOL, CHOLX, CHLST, CHOLV, HDL, HDLP, LDL, LDLC, DLDLP, TGLX, TRIGL, TRIGP, CHHD, CHHDX  Recent Labs     09/12/20  0910   AP 83   TP 8.5*   ALB 3.7   GLOB 4.8*     No results for input(s): INR, PTP, APTT, INREXT in the last 72 hours.    No components found for: [place]    Current Facility-Administered Medications   Medication Dose Route Frequency    magnesium oxide (MAG-OX) tablet 400 mg  400 mg Oral DAILY    potassium chloride (KLOR-CON) packet for solution 20 mEq  20 mEq Oral BID WITH MEALS    metoprolol tartrate (LOPRESSOR) tablet 50 mg  50 mg Oral BID    aspirin tablet 325 mg  325 mg Oral DAILY    dilTIAZem ER (CARDIZEM CD) capsule 240 mg  240 mg Oral DAILY    metFORMIN (GLUCOPHAGE) tablet 500 mg  500 mg Oral BID WITH MEALS    simvastatin (ZOCOR) tablet 20 mg  20 mg Oral QHS    sodium chloride (NS) flush 5-40 mL  5-40 mL IntraVENous Q8H    sodium chloride (NS) flush 5-40 mL 5-40 mL IntraVENous PRN    acetaminophen (TYLENOL) tablet 650 mg  650 mg Oral Q6H PRN    Or    acetaminophen (TYLENOL) suppository 650 mg  650 mg Rectal Q6H PRN    polyethylene glycol (MIRALAX) packet 17 g  17 g Oral DAILY PRN    ondansetron (ZOFRAN ODT) tablet 4 mg  4 mg Oral Q8H PRN    Or    ondansetron (ZOFRAN) injection 4 mg  4 mg IntraVENous Q6H PRN    famotidine (PEPCID) tablet 20 mg  20 mg Oral BID    enoxaparin (LOVENOX) injection 40 mg  40 mg SubCUTAneous DAILY    glucose chewable tablet 16 g  4 Tab Oral PRN    dextrose (D50W) injection syrg 12.5-25 g  25-50 mL IntraVENous PRN    glucagon (GLUCAGEN) injection 1 mg  1 mg IntraMUSCular PRN    insulin lispro (HUMALOG) injection   SubCUTAneous QID    hydrALAZINE (APRESOLINE) 20 mg/mL injection 20 mg  20 mg IntraVENous Q6H PRN        Ashwin Perales MD

## 2020-09-16 LAB
ECHO AO ROOT DIAM: 3.3 CM
ECHO AV PEAK GRADIENT: 7 MMHG
ECHO AV PEAK VELOCITY: 128 CM/S
ECHO EST RA PRESSURE: 3 MMHG
ECHO LV E' SEPTAL VELOCITY: 6.14 CM/S
ECHO LV INTERNAL DIMENSION DIASTOLIC: 4.14 CM (ref 4.2–5.9)
ECHO LV INTERNAL DIMENSION SYSTOLIC: 2.72 CM
ECHO LV IVSD: 0.86 CM (ref 0.6–1)
ECHO LV MASS 2D: 101.4 G (ref 88–224)
ECHO LV MASS INDEX 2D: 50.2 G/M2 (ref 49–115)
ECHO LV POSTERIOR WALL DIASTOLIC: 0.77 CM (ref 0.6–1)
ECHO LVOT DIAM: 2 CM
ECHO LVOT PEAK GRADIENT: 6 MMHG
ECHO LVOT PEAK VELOCITY: 120 CM/S
ECHO MV A VELOCITY: 74 CM/S
ECHO MV E DECELERATION TIME (DT): 0.19 S
ECHO MV E VELOCITY: 63.5 CM/S
ECHO MV E/A RATIO: 0.86
ECHO MV E/E' SEPTAL: 10.34
ECHO PV MAX VELOCITY: 75.4 CM/S
ECHO PV PEAK INSTANTANEOUS GRADIENT SYSTOLIC: 2 MMHG
ECHO PV REGURGITANT MAX VELOCITY: 120 CM/S
ECHO PV REGURGITANT MAX VELOCITY: 128 CM/S
ECHO PV REGURGITANT MAX VELOCITY: 75.4 CM/S
ECHO PVEIN A DURATION: 0.1 S
ECHO PVEIN A VELOCITY: 24.7 CM/S
ECHO RIGHT VENTRICULAR SYSTOLIC PRESSURE (RVSP): 13 MMHG
ECHO RV INTERNAL DIMENSION: 3.21 CM
ECHO TV MAX VELOCITY: 158 CM/S
ECHO TV REGURGITANT PEAK GRADIENT: 10 MMHG

## 2021-04-06 ENCOUNTER — TRANSCRIBE ORDER (OUTPATIENT)
Dept: SCHEDULING | Age: 61
End: 2021-04-06

## 2021-04-06 DIAGNOSIS — I10 ESSENTIAL HYPERTENSION, MALIGNANT: Primary | ICD-10-CM

## 2021-09-14 ENCOUNTER — HOSPITAL ENCOUNTER (OUTPATIENT)
Age: 61
Discharge: HOME | End: 2021-09-15
Attending: SURGERY | Admitting: SURGERY
Payer: COMMERCIAL

## 2021-09-14 ENCOUNTER — APPOINTMENT (OUTPATIENT)
Dept: CT IMAGING | Age: 61
End: 2021-09-14
Attending: PHYSICIAN ASSISTANT
Payer: COMMERCIAL

## 2021-09-14 ENCOUNTER — ANESTHESIA (OUTPATIENT)
Dept: SURGERY | Age: 61
End: 2021-09-14
Payer: COMMERCIAL

## 2021-09-14 ENCOUNTER — ANESTHESIA EVENT (OUTPATIENT)
Dept: SURGERY | Age: 61
End: 2021-09-14
Payer: COMMERCIAL

## 2021-09-14 DIAGNOSIS — K38.9 APPENDICOLITH: ICD-10-CM

## 2021-09-14 DIAGNOSIS — K35.30 ACUTE APPENDICITIS WITH LOCALIZED PERITONITIS, WITHOUT PERFORATION, ABSCESS, OR GANGRENE: ICD-10-CM

## 2021-09-14 DIAGNOSIS — E87.6 HYPOKALEMIA: ICD-10-CM

## 2021-09-14 DIAGNOSIS — K35.890 OTHER ACUTE APPENDICITIS: Primary | ICD-10-CM

## 2021-09-14 PROBLEM — K37 APPENDICITIS: Status: ACTIVE | Noted: 2021-09-14

## 2021-09-14 LAB
ALBUMIN SERPL-MCNC: 3.6 G/DL (ref 3.5–5)
ALBUMIN/GLOB SERPL: 0.6 {RATIO} (ref 1.1–2.2)
ALP SERPL-CCNC: 86 U/L (ref 45–117)
ALT SERPL-CCNC: 82 U/L (ref 12–78)
ANION GAP SERPL CALC-SCNC: 8 MMOL/L (ref 5–15)
APPEARANCE UR: CLEAR
AST SERPL W P-5'-P-CCNC: 93 U/L (ref 15–37)
BACTERIA URNS QL MICRO: NEGATIVE /HPF
BASOPHILS # BLD: 0 K/UL (ref 0–0.1)
BASOPHILS NFR BLD: 0 % (ref 0–1)
BILIRUB SERPL-MCNC: 0.7 MG/DL (ref 0.2–1)
BILIRUB UR QL: NEGATIVE
BUN SERPL-MCNC: 5 MG/DL (ref 6–20)
BUN/CREAT SERPL: 5 (ref 12–20)
CA-I BLD-MCNC: 9.1 MG/DL (ref 8.5–10.1)
CHLORIDE SERPL-SCNC: 102 MMOL/L (ref 97–108)
CO2 SERPL-SCNC: 24 MMOL/L (ref 21–32)
COLOR UR: ABNORMAL
CREAT SERPL-MCNC: 0.91 MG/DL (ref 0.7–1.3)
DIFFERENTIAL METHOD BLD: ABNORMAL
EOSINOPHIL # BLD: 0.1 K/UL (ref 0–0.4)
EOSINOPHIL NFR BLD: 1 % (ref 0–7)
ERYTHROCYTE [DISTWIDTH] IN BLOOD BY AUTOMATED COUNT: 13.6 % (ref 11.5–14.5)
GLOBULIN SER CALC-MCNC: 5.8 G/DL (ref 2–4)
GLUCOSE BLD STRIP.AUTO-MCNC: 184 MG/DL (ref 65–117)
GLUCOSE BLD STRIP.AUTO-MCNC: 213 MG/DL (ref 65–117)
GLUCOSE SERPL-MCNC: 150 MG/DL (ref 65–100)
GLUCOSE UR STRIP.AUTO-MCNC: 50 MG/DL
HCT VFR BLD AUTO: 46.4 % (ref 36.6–50.3)
HGB BLD-MCNC: 16.2 G/DL (ref 12.1–17)
HGB UR QL STRIP: NEGATIVE
IMM GRANULOCYTES # BLD AUTO: 0 K/UL (ref 0–0.04)
IMM GRANULOCYTES NFR BLD AUTO: 0 % (ref 0–0.5)
KETONES UR QL STRIP.AUTO: 5 MG/DL
LEUKOCYTE ESTERASE UR QL STRIP.AUTO: NEGATIVE
LYMPHOCYTES # BLD: 2.9 K/UL (ref 0.8–3.5)
LYMPHOCYTES NFR BLD: 29 % (ref 12–49)
MCH RBC QN AUTO: 35.4 PG (ref 26–34)
MCHC RBC AUTO-ENTMCNC: 34.9 G/DL (ref 30–36.5)
MCV RBC AUTO: 101.5 FL (ref 80–99)
MONOCYTES # BLD: 1 K/UL (ref 0–1)
MONOCYTES NFR BLD: 10 % (ref 5–13)
MUCOUS THREADS URNS QL MICRO: ABNORMAL /LPF
NEUTS SEG # BLD: 6 K/UL (ref 1.8–8)
NEUTS SEG NFR BLD: 60 % (ref 32–75)
NITRITE UR QL STRIP.AUTO: NEGATIVE
NRBC # BLD: 0 K/UL (ref 0–0.01)
NRBC BLD-RTO: 0 PER 100 WBC
PERFORMED BY, TECHID: ABNORMAL
PERFORMED BY, TECHID: ABNORMAL
PH UR STRIP: 6 [PH] (ref 5–8)
PLATELET # BLD AUTO: 207 K/UL (ref 150–400)
PMV BLD AUTO: 11.8 FL (ref 8.9–12.9)
POTASSIUM SERPL-SCNC: 3.2 MMOL/L (ref 3.5–5.1)
PROT SERPL-MCNC: 9.4 G/DL (ref 6.4–8.2)
PROT UR STRIP-MCNC: NEGATIVE MG/DL
RBC # BLD AUTO: 4.57 M/UL (ref 4.1–5.7)
RBC #/AREA URNS HPF: ABNORMAL /HPF (ref 0–5)
SODIUM SERPL-SCNC: 134 MMOL/L (ref 136–145)
SP GR UR REFRACTOMETRY: 1.01 (ref 1–1.03)
UROBILINOGEN UR QL STRIP.AUTO: 0.1 EU/DL (ref 0.1–1)
WBC # BLD AUTO: 10.1 K/UL (ref 4.1–11.1)
WBC URNS QL MICRO: ABNORMAL /HPF (ref 0–4)

## 2021-09-14 PROCEDURE — 77030016151 HC PROTCTR LNS DFOG COVD -B: Performed by: SURGERY

## 2021-09-14 PROCEDURE — 99220 PR INITIAL OBSERVATION CARE/DAY 70 MINUTES: CPT | Performed by: SURGERY

## 2021-09-14 PROCEDURE — 77030012799 HC TRCR GELPRT BLN AMR -B: Performed by: SURGERY

## 2021-09-14 PROCEDURE — 77030018525 HC STPLR ENDOSC J&J -G1: Performed by: SURGERY

## 2021-09-14 PROCEDURE — 99283 EMERGENCY DEPT VISIT LOW MDM: CPT

## 2021-09-14 PROCEDURE — 74011250637 HC RX REV CODE- 250/637: Performed by: SURGERY

## 2021-09-14 PROCEDURE — 74011250636 HC RX REV CODE- 250/636: Performed by: PHYSICIAN ASSISTANT

## 2021-09-14 PROCEDURE — 77030008518 HC TBNG INSUF ENDO STRY -B: Performed by: SURGERY

## 2021-09-14 PROCEDURE — 77030040361 HC SLV COMPR DVT MDII -B: Performed by: SURGERY

## 2021-09-14 PROCEDURE — 87086 URINE CULTURE/COLONY COUNT: CPT

## 2021-09-14 PROCEDURE — 77030010507 HC ADH SKN DERMBND J&J -B: Performed by: SURGERY

## 2021-09-14 PROCEDURE — 77030008606 HC TRCR ENDOSC KII AMR -B: Performed by: SURGERY

## 2021-09-14 PROCEDURE — 76060000033 HC ANESTHESIA 1 TO 1.5 HR: Performed by: SURGERY

## 2021-09-14 PROCEDURE — 76010000149 HC OR TIME 1 TO 1.5 HR: Performed by: SURGERY

## 2021-09-14 PROCEDURE — 88304 TISSUE EXAM BY PATHOLOGIST: CPT

## 2021-09-14 PROCEDURE — 82962 GLUCOSE BLOOD TEST: CPT

## 2021-09-14 PROCEDURE — 77030008756 HC TU IRR SUC STRY -B: Performed by: SURGERY

## 2021-09-14 PROCEDURE — 77030005513 HC CATH URETH FOL11 MDII -B: Performed by: SURGERY

## 2021-09-14 PROCEDURE — 77030041703 HC SLV COMPR DVT ARJO -B: Performed by: SURGERY

## 2021-09-14 PROCEDURE — 77030031139 HC SUT VCRL2 J&J -A: Performed by: SURGERY

## 2021-09-14 PROCEDURE — 36415 COLL VENOUS BLD VENIPUNCTURE: CPT

## 2021-09-14 PROCEDURE — 74177 CT ABD & PELVIS W/CONTRAST: CPT

## 2021-09-14 PROCEDURE — 74011250636 HC RX REV CODE- 250/636: Performed by: SURGERY

## 2021-09-14 PROCEDURE — 96374 THER/PROPH/DIAG INJ IV PUSH: CPT

## 2021-09-14 PROCEDURE — 2709999900 HC NON-CHARGEABLE SUPPLY: Performed by: SURGERY

## 2021-09-14 PROCEDURE — 74011000636 HC RX REV CODE- 636: Performed by: PHYSICIAN ASSISTANT

## 2021-09-14 PROCEDURE — 76210000006 HC OR PH I REC 0.5 TO 1 HR: Performed by: SURGERY

## 2021-09-14 PROCEDURE — 80053 COMPREHEN METABOLIC PANEL: CPT

## 2021-09-14 PROCEDURE — 85025 COMPLETE CBC W/AUTO DIFF WBC: CPT

## 2021-09-14 PROCEDURE — 77030022703 HC LIGASURE  BLNT LAPSCP SEAL COVD -E: Performed by: SURGERY

## 2021-09-14 PROCEDURE — 77030010513 HC APPL CLP LIG J&J -C: Performed by: SURGERY

## 2021-09-14 PROCEDURE — 74011000250 HC RX REV CODE- 250: Performed by: SURGERY

## 2021-09-14 PROCEDURE — 74011000258 HC RX REV CODE- 258: Performed by: PHYSICIAN ASSISTANT

## 2021-09-14 PROCEDURE — 81001 URINALYSIS AUTO W/SCOPE: CPT

## 2021-09-14 PROCEDURE — 74011250636 HC RX REV CODE- 250/636: Performed by: NURSE ANESTHETIST, CERTIFIED REGISTERED

## 2021-09-14 PROCEDURE — 74011000250 HC RX REV CODE- 250: Performed by: NURSE ANESTHETIST, CERTIFIED REGISTERED

## 2021-09-14 PROCEDURE — 77030002933 HC SUT MCRYL J&J -A: Performed by: SURGERY

## 2021-09-14 PROCEDURE — 77030009851 HC PCH RTVR ENDOSC AMR -B: Performed by: SURGERY

## 2021-09-14 PROCEDURE — 44970 LAPAROSCOPY APPENDECTOMY: CPT | Performed by: SURGERY

## 2021-09-14 PROCEDURE — 77030018842 HC SOL IRR SOD CL 9% BAXT -A: Performed by: SURGERY

## 2021-09-14 PROCEDURE — 77030005515 HC CATH URETH FOL14 BARD -B: Performed by: SURGERY

## 2021-09-14 RX ORDER — LIDOCAINE HYDROCHLORIDE 10 MG/ML
0.1 INJECTION, SOLUTION EPIDURAL; INFILTRATION; INTRACAUDAL; PERINEURAL AS NEEDED
Status: DISCONTINUED | OUTPATIENT
Start: 2021-09-14 | End: 2021-09-14 | Stop reason: HOSPADM

## 2021-09-14 RX ORDER — PROPOFOL 10 MG/ML
INJECTION, EMULSION INTRAVENOUS AS NEEDED
Status: DISCONTINUED | OUTPATIENT
Start: 2021-09-14 | End: 2021-09-14 | Stop reason: HOSPADM

## 2021-09-14 RX ORDER — HYDROMORPHONE HYDROCHLORIDE 2 MG/ML
INJECTION, SOLUTION INTRAMUSCULAR; INTRAVENOUS; SUBCUTANEOUS AS NEEDED
Status: DISCONTINUED | OUTPATIENT
Start: 2021-09-14 | End: 2021-09-14 | Stop reason: HOSPADM

## 2021-09-14 RX ORDER — FENTANYL CITRATE 50 UG/ML
INJECTION, SOLUTION INTRAMUSCULAR; INTRAVENOUS AS NEEDED
Status: DISCONTINUED | OUTPATIENT
Start: 2021-09-14 | End: 2021-09-14 | Stop reason: HOSPADM

## 2021-09-14 RX ORDER — SODIUM CHLORIDE 0.9 % (FLUSH) 0.9 %
5-40 SYRINGE (ML) INJECTION EVERY 8 HOURS
Status: DISCONTINUED | OUTPATIENT
Start: 2021-09-14 | End: 2021-09-14 | Stop reason: HOSPADM

## 2021-09-14 RX ORDER — ONDANSETRON 4 MG/1
4 TABLET, ORALLY DISINTEGRATING ORAL
Status: DISCONTINUED | OUTPATIENT
Start: 2021-09-14 | End: 2021-09-15 | Stop reason: HOSPADM

## 2021-09-14 RX ORDER — SODIUM CHLORIDE 0.9 % (FLUSH) 0.9 %
5-40 SYRINGE (ML) INJECTION EVERY 8 HOURS
Status: DISCONTINUED | OUTPATIENT
Start: 2021-09-14 | End: 2021-09-15 | Stop reason: HOSPADM

## 2021-09-14 RX ORDER — SUCCINYLCHOLINE CHLORIDE 20 MG/ML
INJECTION INTRAMUSCULAR; INTRAVENOUS AS NEEDED
Status: DISCONTINUED | OUTPATIENT
Start: 2021-09-14 | End: 2021-09-14 | Stop reason: HOSPADM

## 2021-09-14 RX ORDER — MORPHINE SULFATE 2 MG/ML
2 INJECTION, SOLUTION INTRAMUSCULAR; INTRAVENOUS ONCE
Status: COMPLETED | OUTPATIENT
Start: 2021-09-14 | End: 2021-09-14

## 2021-09-14 RX ORDER — FENTANYL CITRATE 50 UG/ML
50 INJECTION, SOLUTION INTRAMUSCULAR; INTRAVENOUS
Status: DISCONTINUED | OUTPATIENT
Start: 2021-09-14 | End: 2021-09-14 | Stop reason: HOSPADM

## 2021-09-14 RX ORDER — NORETHINDRONE AND ETHINYL ESTRADIOL 0.5-0.035
5 KIT ORAL AS NEEDED
Status: DISCONTINUED | OUTPATIENT
Start: 2021-09-14 | End: 2021-09-14 | Stop reason: HOSPADM

## 2021-09-14 RX ORDER — HYDROCODONE BITARTRATE AND ACETAMINOPHEN 5; 325 MG/1; MG/1
1 TABLET ORAL AS NEEDED
Status: DISCONTINUED | OUTPATIENT
Start: 2021-09-14 | End: 2021-09-14 | Stop reason: HOSPADM

## 2021-09-14 RX ORDER — POTASSIUM CHLORIDE 7.45 MG/ML
10 INJECTION INTRAVENOUS ONCE
Status: ACTIVE | OUTPATIENT
Start: 2021-09-14 | End: 2021-09-15

## 2021-09-14 RX ORDER — SODIUM CHLORIDE 0.9 % (FLUSH) 0.9 %
5-40 SYRINGE (ML) INJECTION AS NEEDED
Status: DISCONTINUED | OUTPATIENT
Start: 2021-09-14 | End: 2021-09-15 | Stop reason: HOSPADM

## 2021-09-14 RX ORDER — DEXAMETHASONE SODIUM PHOSPHATE 4 MG/ML
INJECTION, SOLUTION INTRA-ARTICULAR; INTRALESIONAL; INTRAMUSCULAR; INTRAVENOUS; SOFT TISSUE AS NEEDED
Status: DISCONTINUED | OUTPATIENT
Start: 2021-09-14 | End: 2021-09-14 | Stop reason: HOSPADM

## 2021-09-14 RX ORDER — BUPIVACAINE HYDROCHLORIDE 5 MG/ML
INJECTION, SOLUTION EPIDURAL; INTRACAUDAL AS NEEDED
Status: DISCONTINUED | OUTPATIENT
Start: 2021-09-14 | End: 2021-09-14 | Stop reason: HOSPADM

## 2021-09-14 RX ORDER — ACETAMINOPHEN 325 MG/1
650 TABLET ORAL EVERY 6 HOURS
Status: DISCONTINUED | OUTPATIENT
Start: 2021-09-14 | End: 2021-09-15 | Stop reason: HOSPADM

## 2021-09-14 RX ORDER — OXYCODONE HYDROCHLORIDE 5 MG/1
5 TABLET ORAL
Status: DISCONTINUED | OUTPATIENT
Start: 2021-09-14 | End: 2021-09-15 | Stop reason: HOSPADM

## 2021-09-14 RX ORDER — ONDANSETRON 2 MG/ML
INJECTION INTRAMUSCULAR; INTRAVENOUS AS NEEDED
Status: DISCONTINUED | OUTPATIENT
Start: 2021-09-14 | End: 2021-09-14 | Stop reason: HOSPADM

## 2021-09-14 RX ORDER — LIDOCAINE HYDROCHLORIDE 20 MG/ML
INJECTION, SOLUTION EPIDURAL; INFILTRATION; INTRACAUDAL; PERINEURAL AS NEEDED
Status: DISCONTINUED | OUTPATIENT
Start: 2021-09-14 | End: 2021-09-14 | Stop reason: HOSPADM

## 2021-09-14 RX ORDER — ROCURONIUM BROMIDE 10 MG/ML
INJECTION, SOLUTION INTRAVENOUS AS NEEDED
Status: DISCONTINUED | OUTPATIENT
Start: 2021-09-14 | End: 2021-09-14 | Stop reason: HOSPADM

## 2021-09-14 RX ORDER — SODIUM CHLORIDE 0.9 % (FLUSH) 0.9 %
5-40 SYRINGE (ML) INJECTION AS NEEDED
Status: DISCONTINUED | OUTPATIENT
Start: 2021-09-14 | End: 2021-09-14 | Stop reason: HOSPADM

## 2021-09-14 RX ORDER — HYDROMORPHONE HYDROCHLORIDE 1 MG/ML
0.4 INJECTION, SOLUTION INTRAMUSCULAR; INTRAVENOUS; SUBCUTANEOUS
Status: DISCONTINUED | OUTPATIENT
Start: 2021-09-14 | End: 2021-09-14 | Stop reason: HOSPADM

## 2021-09-14 RX ORDER — SODIUM CHLORIDE, SODIUM LACTATE, POTASSIUM CHLORIDE, CALCIUM CHLORIDE 600; 310; 30; 20 MG/100ML; MG/100ML; MG/100ML; MG/100ML
100 INJECTION, SOLUTION INTRAVENOUS CONTINUOUS
Status: DISPENSED | OUTPATIENT
Start: 2021-09-14 | End: 2021-09-15

## 2021-09-14 RX ORDER — GUAIFENESIN 100 MG/5ML
81 LIQUID (ML) ORAL DAILY
COMMUNITY

## 2021-09-14 RX ORDER — HYDROMORPHONE HYDROCHLORIDE 1 MG/ML
1 INJECTION, SOLUTION INTRAMUSCULAR; INTRAVENOUS; SUBCUTANEOUS
Status: DISCONTINUED | OUTPATIENT
Start: 2021-09-14 | End: 2021-09-15 | Stop reason: HOSPADM

## 2021-09-14 RX ORDER — SODIUM CHLORIDE, SODIUM LACTATE, POTASSIUM CHLORIDE, CALCIUM CHLORIDE 600; 310; 30; 20 MG/100ML; MG/100ML; MG/100ML; MG/100ML
INJECTION, SOLUTION INTRAVENOUS
Status: DISCONTINUED | OUTPATIENT
Start: 2021-09-14 | End: 2021-09-14 | Stop reason: HOSPADM

## 2021-09-14 RX ORDER — CEFAZOLIN SODIUM 1 G/3ML
INJECTION, POWDER, FOR SOLUTION INTRAMUSCULAR; INTRAVENOUS AS NEEDED
Status: DISCONTINUED | OUTPATIENT
Start: 2021-09-14 | End: 2021-09-14 | Stop reason: HOSPADM

## 2021-09-14 RX ORDER — MIDAZOLAM HYDROCHLORIDE 1 MG/ML
0.5 INJECTION, SOLUTION INTRAMUSCULAR; INTRAVENOUS
Status: DISCONTINUED | OUTPATIENT
Start: 2021-09-14 | End: 2021-09-14 | Stop reason: HOSPADM

## 2021-09-14 RX ORDER — AMLODIPINE BESYLATE 5 MG/1
5 TABLET ORAL DAILY
COMMUNITY

## 2021-09-14 RX ORDER — FENTANYL CITRATE 50 UG/ML
50 INJECTION, SOLUTION INTRAMUSCULAR; INTRAVENOUS AS NEEDED
Status: DISCONTINUED | OUTPATIENT
Start: 2021-09-14 | End: 2021-09-14 | Stop reason: HOSPADM

## 2021-09-14 RX ORDER — DIPHENHYDRAMINE HYDROCHLORIDE 50 MG/ML
12.5 INJECTION, SOLUTION INTRAMUSCULAR; INTRAVENOUS AS NEEDED
Status: DISCONTINUED | OUTPATIENT
Start: 2021-09-14 | End: 2021-09-14 | Stop reason: HOSPADM

## 2021-09-14 RX ORDER — METFORMIN HYDROCHLORIDE 500 MG/1
500 TABLET, FILM COATED, EXTENDED RELEASE ORAL DAILY
COMMUNITY

## 2021-09-14 RX ORDER — ONDANSETRON 2 MG/ML
4 INJECTION INTRAMUSCULAR; INTRAVENOUS AS NEEDED
Status: DISCONTINUED | OUTPATIENT
Start: 2021-09-14 | End: 2021-09-14 | Stop reason: HOSPADM

## 2021-09-14 RX ORDER — MIDAZOLAM HYDROCHLORIDE 1 MG/ML
1 INJECTION, SOLUTION INTRAMUSCULAR; INTRAVENOUS AS NEEDED
Status: DISCONTINUED | OUTPATIENT
Start: 2021-09-14 | End: 2021-09-14 | Stop reason: HOSPADM

## 2021-09-14 RX ORDER — ONDANSETRON 2 MG/ML
4 INJECTION INTRAMUSCULAR; INTRAVENOUS
Status: DISCONTINUED | OUTPATIENT
Start: 2021-09-14 | End: 2021-09-15 | Stop reason: HOSPADM

## 2021-09-14 RX ORDER — GLYCOPYRROLATE 0.2 MG/ML
INJECTION INTRAMUSCULAR; INTRAVENOUS AS NEEDED
Status: DISCONTINUED | OUTPATIENT
Start: 2021-09-14 | End: 2021-09-14 | Stop reason: HOSPADM

## 2021-09-14 RX ADMIN — DEXAMETHASONE SODIUM PHOSPHATE 4 MG: 4 INJECTION, SOLUTION INTRA-ARTICULAR; INTRALESIONAL; INTRAMUSCULAR; INTRAVENOUS; SOFT TISSUE at 16:04

## 2021-09-14 RX ADMIN — SUCCINYLCHOLINE CHLORIDE 120 MG: 20 INJECTION, SOLUTION INTRAMUSCULAR; INTRAVENOUS at 15:41

## 2021-09-14 RX ADMIN — CEFAZOLIN SODIUM 2 G: 1 INJECTION, POWDER, FOR SOLUTION INTRAMUSCULAR; INTRAVENOUS at 15:56

## 2021-09-14 RX ADMIN — MORPHINE SULFATE 2 MG: 2 INJECTION, SOLUTION INTRAMUSCULAR; INTRAVENOUS at 13:45

## 2021-09-14 RX ADMIN — Medication 10 ML: at 21:38

## 2021-09-14 RX ADMIN — PIPERACILLIN AND TAZOBACTAM 3.38 G: 3; .375 INJECTION, POWDER, LYOPHILIZED, FOR SOLUTION INTRAVENOUS at 13:45

## 2021-09-14 RX ADMIN — SODIUM CHLORIDE, POTASSIUM CHLORIDE, SODIUM LACTATE AND CALCIUM CHLORIDE 100 ML/HR: 600; 310; 30; 20 INJECTION, SOLUTION INTRAVENOUS at 18:47

## 2021-09-14 RX ADMIN — ACETAMINOPHEN 650 MG: 325 TABLET ORAL at 23:06

## 2021-09-14 RX ADMIN — PROPOFOL 180 MG: 10 INJECTION, EMULSION INTRAVENOUS at 15:40

## 2021-09-14 RX ADMIN — SODIUM CHLORIDE, POTASSIUM CHLORIDE, SODIUM LACTATE AND CALCIUM CHLORIDE: 600; 310; 30; 20 INJECTION, SOLUTION INTRAVENOUS at 15:40

## 2021-09-14 RX ADMIN — Medication 10 ML: at 18:48

## 2021-09-14 RX ADMIN — SODIUM CHLORIDE 1000 ML: 9 INJECTION, SOLUTION INTRAVENOUS at 13:45

## 2021-09-14 RX ADMIN — ROCURONIUM BROMIDE 40 MG: 50 INJECTION, SOLUTION INTRAVENOUS at 15:41

## 2021-09-14 RX ADMIN — GLYCOPYRROLATE 0.4 MG: 0.2 INJECTION, SOLUTION INTRAMUSCULAR; INTRAVENOUS at 16:32

## 2021-09-14 RX ADMIN — HYDROMORPHONE HYDROCHLORIDE 0.5 MG: 2 INJECTION INTRAMUSCULAR; INTRAVENOUS; SUBCUTANEOUS at 15:59

## 2021-09-14 RX ADMIN — IOPAMIDOL 100 ML: 755 INJECTION, SOLUTION INTRAVENOUS at 12:55

## 2021-09-14 RX ADMIN — FENTANYL CITRATE 100 MCG: 50 INJECTION, SOLUTION INTRAMUSCULAR; INTRAVENOUS at 15:38

## 2021-09-14 RX ADMIN — HYDROMORPHONE HYDROCHLORIDE 0.5 MG: 2 INJECTION INTRAMUSCULAR; INTRAVENOUS; SUBCUTANEOUS at 16:37

## 2021-09-14 RX ADMIN — ACETAMINOPHEN 650 MG: 325 TABLET ORAL at 18:48

## 2021-09-14 RX ADMIN — ONDANSETRON 4 MG: 2 INJECTION INTRAMUSCULAR; INTRAVENOUS at 16:04

## 2021-09-14 RX ADMIN — LIDOCAINE HYDROCHLORIDE 100 MG: 20 INJECTION, SOLUTION EPIDURAL; INFILTRATION; INTRACAUDAL; PERINEURAL at 15:40

## 2021-09-14 NOTE — OP NOTES
Operative Note    Patient: Violeta Pabon  YOB: 1960  MRN: [id number]    Date of Procedure: 9/14/2021     Pre-Op Diagnosis: Appendicitis    Post-Op Diagnosis: Same as preoperative diagnosis. Procedure(s):  APPENDECTOMY LAPAROSCOPIC    Surgeon(s):  Devin Jose DO    Surgical Assistant: None    Anesthesia: General     Estimated Blood Loss (mL): less than 50     Complications: None    Specimens:   ID Type Source Tests Collected by Time Destination   1 : Appendix Preservative Appendix  Miri Jose DO 9/14/2021 1622 Pathology   1 : Urine Urine Novak Specimen CULTURE, URINE, URINALYSIS W/MICROSCOPIC Lula Conway RN 9/14/2021 1620 Microbiology        Implants: * No implants in log *    Drains: * No LDAs found *    Findings: large and inflamed appendix with large appendicolith at the base    Indications for procedure: Patient is a 57-year-old male who presented to the emergency department with a 2-day complaint of right lower quadrant abdominal pain radiating to the right flank. CT of the abdomen pelvis demonstrated acute appendicitis with a large appendicolith. Risks and benefits of laparoscopic appendectomy possible open were explained to the patient in detail including but not limited to bleeding, infection, damage to surrounding structures, need for further surgery. Patient was accepting of the risks and benefits and wished to proceed. Description of procedure: Patient was brought back to the operating room suite and placed in the supine position on the operating room table with the left arm tucked. General anesthesia and endotracheal intubation were performed. Preoperative antibiotics were given and a Novak catheter was inserted. The abdomen was then prepped and draped in the typical sterile fashion. A timeout was performed confirming correct patient correct procedure correct side, all present were in agreement.   Half percent Marcaine was injected into these incision sites.  A 12 mm incision was then made supraumbilically and the Hannah Alexanders technique was utilized to enter the abdomen. 0 Vicryl suture was placed through the fascia around the fascial incision at the umbilicus. The laparoscope was then inserted into the abdomen and diagnostic laparoscopy was performed confirming no injury to the bowel on entry and confirming no other intra-abdominal pathology. A 5 mm port was then placed in the left lower quadrant and an additional 5 mm port placed suprapubically. The patient was then placed in Trendelenburg and left side down position. Atraumatic bowel graspers were used to identify the cecum and subsequently the appendix. It was noted that the appendix was posterior and there was a large amount of inflammation and adhesions of the surrounding tissues to a large appendicolith which was present at the base of the appendix. Blunt dissection was used to mobilize the appendix and then a LigaSure device was used to take down the mesoappendix. There was some bleeding noted from the appendiceal artery, this was localized and a clip was used for hemostasis. The LigaSure was used to take down the mesoappendix all the way to the base of the appendix. After adequate clearance was noted a 45 mm laparoscopic stapler with a blue load was used to staple across the base of the appendix. The specimen was placed in a bag and removed from the abdomen. Further evaluation of the right lower quadrant noted there was adequate hemostasis and the staple line was intact. All ports were then removed from the abdomen and the fascial suture at the umbilical incision was closed. The skin incisions were closed using 4-0 Monocryl suture. The skin was then cleaned and dried and Dermabond was applied. Patient was reversed from anesthesia, the Novak catheter was removed, and the patient was transferred to the post anesthesia care unit in stable condition.      Chayito Jose DO

## 2021-09-14 NOTE — ED NOTES
Report given to Cain in PACU, Cora Hammonds in [place] about pt. Dr. Jose to bedside prior to going to OR. Waiting for OR tech to retreive pt.

## 2021-09-14 NOTE — ANESTHESIA PREPROCEDURE EVALUATION
Relevant Problems   CARDIOVASCULAR   (+) HTN (hypertension)      ENDOCRINE   (+) Diabetes mellitus type 2, controlled (HCC)       Anesthetic History   No history of anesthetic complications            Review of Systems / Medical History  Patient summary reviewed, nursing notes reviewed and pertinent labs reviewed    Pulmonary  Within defined limits                 Neuro/Psych   Within defined limits           Cardiovascular    Hypertension        Dysrhythmias (rate controlled) : atrial fibrillation  CAD and hyperlipidemia    Exercise tolerance: >4 METS  Comments: Sept 2020-[facility]:  LV: Calculated LVEF is 64%. Normal cavity size, wall thickness, systolic function (ejection fraction normal) and diastolic function. Wall motion: normal. Normal left ventricular strain.    GI/Hepatic/Renal     GERD: well controlled           Endo/Other    Diabetes: well controlled, type 2         Other Findings            Physical Exam    Airway  Mallampati: II  TM Distance: 4 - 6 cm  Neck ROM: normal range of motion   Mouth opening: Normal     Cardiovascular    Rhythm: regular  Rate: normal         Dental    Dentition: Poor dentition     Pulmonary  Breath sounds clear to auscultation               Abdominal  GI exam deferred       Other Findings            Anesthetic Plan    ASA: 3, emergent  Anesthesia type: general          Induction: Intravenous and RSI  Anesthetic plan and risks discussed with: Patient

## 2021-09-14 NOTE — ANESTHESIA POSTPROCEDURE EVALUATION
Procedure(s):  APPENDECTOMY LAPAROSCOPIC.     general    Anesthesia Post Evaluation      Multimodal analgesia: multimodal analgesia used between 6 hours prior to anesthesia start to PACU discharge  Patient location during evaluation: PACU  Patient participation: complete - patient participated  Level of consciousness: awake  Pain score: 0  Pain management: adequate  Airway patency: patent  Anesthetic complications: no  Cardiovascular status: acceptable  Respiratory status: acceptable  Hydration status: acceptable  Post anesthesia nausea and vomiting:  controlled  Final Post Anesthesia Temperature Assessment:  Normothermia (36.0-37.5 degrees C)      INITIAL Post-op Vital signs:   Vitals Value Taken Time   /81 09/14/21 1655   Temp     Pulse 77 09/14/21 1655   Resp 16 09/14/21 1655   SpO2 98 % 09/14/21 1655

## 2021-09-14 NOTE — BRIEF OP NOTE
Brief Postoperative Note    Patient: Fanny Decker  YOB: 1960  MRN: [id number]    Date of Procedure: 9/14/2021     Pre-Op Diagnosis: Appendicitis    Post-Op Diagnosis: Same as preoperative diagnosis.       Procedure(s):  APPENDECTOMY LAPAROSCOPIC    Surgeon(s):  Oziel Jose DO    Surgical Assistant: None    Anesthesia: General     Estimated Blood Loss (mL): less than 50     Complications: None    Specimens:   ID Type Source Tests Collected by Time Destination   1 : Appendix Preservative Appendix  Miri Jose DO 9/14/2021 1622 Pathology   1 : Urine Urine Novak Specimen CULTURE, URINE, URINALYSIS W/MICROSCOPIC Aric Arredondo RN 9/14/2021 1620 Microbiology        Implants: * No implants in log *    Drains: * No LDAs found *    Findings: large and inflamed appendix with large appendicolith at the base    Electronically Signed by Oziel Jose DO on 9/14/2021 at 4:37 PM

## 2021-09-14 NOTE — H&P
Surgery History and Physical    Subjective:      Crystal Wood is a 63 y.o. male who is being admitted for abdominal pain. The pain is located in the RLQ with radiation to R back and right groin. Pain is described as sharp and measures 9/10 in intensity. Onset of pain was 2 days ago. Aggravating factors include none. Alleviating factors include pain medication. Associated symptoms include anorexia and flank pain. Patient Active Problem List    Diagnosis Date Noted    Appendicitis 2021    Hypokalemia 09/15/2020    HTN (hypertension) 2020    Diabetes mellitus type 2, controlled ([facility] [address] 2020    Syncopal episodes 2020    Palpitations 2011    Chest pain, unspecified 2011     Past Medical History:   Diagnosis Date    CAD (coronary artery disease)     Diabetes ([facility] [address]     Hypertension       Past Surgical History:   Procedure Laterality Date    IR INJ SINUS TRACT THERAP        Social History     Tobacco Use    Smoking status: Former Smoker     Types: Cigarettes     Quit date: 2010     Years since quittin.7    Smokeless tobacco: Never Used   Substance Use Topics    Alcohol use: Yes     Alcohol/week: 2.5 - 3.3 standard drinks     Types: 3 - 4 Standard drinks or equivalent per week     Comment: occasional      Family History   Problem Relation Age of Onset    Hypertension Mother     Hypertension Father     Diabetes Father     Arrhythmia Sister     Hypertension Brother     Stroke Brother     Hypertension Sister       Prior to Admission medications    Medication Sig Start Date End Date Taking? Authorizing Provider   dilTIAZem ER [facility]) 240 mg capsule Take 240 mg by mouth daily. Candelario Mcgill MD   metFORMIN (GLUCOPHAGE) 500 mg tablet Take 500 mg by mouth two (2) times daily (with meals). Candelario Mcgill MD   vit B Cmplx 3-FA-Vit C-Biotin (NEPHRO PANFILO RX) 1- mg-mg-mcg tablet Take 1 Tab by mouth daily.     Candelario Mcgill MD   simvastatin (ZOCOR) 20 mg tablet Take 20 mg by mouth nightly. Other, MD Candelario   aspirin (ASPIRIN) 325 mg tablet Take 325 mg by mouth daily. Provider, Historical     No Known Allergies     Review of Systems:    Review of Systems   Constitutional: Negative for chills, fever and weight loss. HENT: Negative for congestion, ear pain and sore throat. Eyes: Negative for blurred vision and redness. Respiratory: Negative for cough, shortness of breath and wheezing. Cardiovascular: Negative for chest pain, palpitations and leg swelling. Gastrointestinal: Positive for abdominal pain and nausea. Negative for vomiting. Genitourinary: Negative for dysuria, frequency and hematuria. Musculoskeletal: Negative for joint pain and myalgias. Skin: Negative for itching and rash. Neurological: Negative for dizziness, seizures and headaches. Endo/Heme/Allergies: Does not bruise/bleed easily. Objective:     Visit Vitals  BP (!) 153/87   Pulse 70   Temp 98.1 °F (36.7 °C)   Resp 16   Ht 5' 7\" (1.702 m)   Wt 190 lb (86.2 kg)   SpO2 99%   BMI 29.76 kg/m²       Physical Exam:    Physical Exam  Constitutional:       General: He is not in acute distress. Appearance: Normal appearance. He is not ill-appearing. HENT:      Head: Normocephalic and atraumatic. Right Ear: External ear normal.      Left Ear: External ear normal.      Nose: Nose normal. No congestion. Mouth/Throat:      Mouth: Mucous membranes are moist.      Pharynx: Oropharynx is clear. Eyes:      General: No scleral icterus. Extraocular Movements: Extraocular movements intact. Conjunctiva/sclera: Conjunctivae normal.   Cardiovascular:      Rate and Rhythm: Normal rate and regular rhythm. Pulses: Normal pulses. Heart sounds: Normal heart sounds. Pulmonary:      Effort: Pulmonary effort is normal. No respiratory distress. Breath sounds: Normal breath sounds. Abdominal:      General: There is no distension.       Palpations: Abdomen is soft. Tenderness: There is abdominal tenderness in the right lower quadrant. There is no guarding or rebound. Comments: Tenderness in right flank as well   Musculoskeletal:         General: No swelling or tenderness. Normal range of motion. Cervical back: Normal range of motion and neck supple. No tenderness. Skin:     General: Skin is warm and dry. Neurological:      General: No focal deficit present. Mental Status: He is alert and oriented to person, place, and time. Psychiatric:         Mood and Affect: Mood normal.         Behavior: Behavior normal.       Imaging:  reviewed  CT    Lab Review:    Recent Results (from the past 24 hour(s))   METABOLIC PANEL, COMPREHENSIVE    Collection Time: 09/14/21 11:04 AM   Result Value Ref Range    Sodium 134 (L) 136 - 145 mmol/L    Potassium 3.2 (L) 3.5 - 5.1 mmol/L    Chloride 102 97 - 108 mmol/L    CO2 24 21 - 32 mmol/L    Anion gap 8 5 - 15 mmol/L    Glucose 150 (H) 65 - 100 mg/dL    BUN 5 (L) 6 - 20 mg/dL    Creatinine 0.91 0.70 - 1.30 mg/dL    BUN/Creatinine ratio 5 (L) 12 - 20      GFR est AA >60 >60 ml/min/1.73m2    GFR est non-AA >60 >60 ml/min/1.73m2    Calcium 9.1 8.5 - 10.1 mg/dL    Bilirubin, total 0.7 0.2 - 1.0 mg/dL    AST (SGOT) 93 (H) 15 - 37 U/L    ALT (SGPT) 82 (H) 12 - 78 U/L    Alk.  phosphatase 86 45 - 117 U/L    Protein, total 9.4 (H) 6.4 - 8.2 g/dL    Albumin 3.6 3.5 - 5.0 g/dL    Globulin 5.8 (H) 2.0 - 4.0 g/dL    A-G Ratio 0.6 (L) 1.1 - 2.2     CBC WITH AUTOMATED DIFF    Collection Time: 09/14/21 11:04 AM   Result Value Ref Range    WBC 10.1 4.1 - 11.1 K/uL    RBC 4.57 4.10 - 5.70 M/uL    HGB 16.2 12.1 - 17.0 g/dL    HCT 46.4 36.6 - 50.3 %    .5 (H) 80.0 - 99.0 FL    MCH 35.4 (H) 26.0 - 34.0 PG    MCHC 34.9 30.0 - 36.5 g/dL    RDW 13.6 11.5 - 14.5 %    PLATELET 768 731 - 212 K/uL    MPV 11.8 8.9 - 12.9 FL    NRBC 0.0 0.0  WBC    ABSOLUTE NRBC 0.00 0.00 - 0.01 K/uL    NEUTROPHILS 60 32 - 75 %    LYMPHOCYTES 29 12 - 49 %    MONOCYTES 10 5 - 13 %    EOSINOPHILS 1 0 - 7 %    BASOPHILS 0 0 - 1 %    IMMATURE GRANULOCYTES 0 0 - 0.5 %    ABS. NEUTROPHILS 6.0 1.8 - 8.0 K/UL    ABS. LYMPHOCYTES 2.9 0.8 - 3.5 K/UL    ABS. MONOCYTES 1.0 0.0 - 1.0 K/UL    ABS. EOSINOPHILS 0.1 0.0 - 0.4 K/UL    ABS. BASOPHILS 0.0 0.0 - 0.1 K/UL    ABS. IMM. GRANS. 0.0 0.00 - 0.04 K/UL    DF AUTOMATED     URINALYSIS W/MICROSCOPIC    Collection Time: 09/14/21 11:04 AM   Result Value Ref Range    Color Yellow/Straw      Appearance Clear Clear      Specific gravity 1.015 1.003 - 1.030      pH (UA) 6.0 5.0 - 8.0      Protein Negative Negative mg/dL    Glucose 50 (A) Negative mg/dL    Ketone 5 (A) Negative mg/dL    Bilirubin Negative Negative      Blood Negative Negative      Urobilinogen 0.1 0.1 - 1.0 EU/dL    Nitrites Negative Negative      Leukocyte Esterase Negative Negative      WBC 0-4 0 - 4 /hpf    RBC 0-5 0 - 5 /hpf    Bacteria Negative Negative /hpf    Mucus Trace /lpf       Assessment:     Abdominal pain, suspect appendicitis    Plan:     1. I recommend proceeding with laparoscopic appendectomy possible open. 2. Discussed aspects of surgical intervention, methods, risks (including by not limited to infection, bleeding, hematoma, and perforation of the intestines or solid organs), and the risks of general anesthetic. The patient understands the risks; any and all questions were answered to the patient's satisfaction.

## 2021-09-14 NOTE — ED PROVIDER NOTES
EMERGENCY DEPARTMENT HISTORY AND PHYSICAL EXAM      Date: 9/14/2021  Patient Name: Yasmany Ulloa    History of Presenting Illness     Chief Complaint   Patient presents with    Abdominal Pain       History Provided By: Patient    HPI: Yasmany Ulloa, 63 y.o. male with a past medical history significant diabetes, hypertension and CAD, diverticulitis presents to the ED with cc of right lower quadrant abdominal pain that started in the right flank about 3 days ago, radiating into the suprapubic region. Patient reports associated bladder fullness. Patient specifically denies chest pain, shortness of breath, hematuria, dysuria, diarrhea, nausea, vomiting, previous abdominal surgeries. Patient has not tried anything for his pain or his symptoms at this time. There are no other complaints, changes, or physical findings at this time. PCP: Mirza Mclain MD    Current Outpatient Medications   Medication Sig Dispense Refill    docusate sodium (COLACE) 100 mg capsule Take 1 Capsule by mouth two (2) times a day for 14 days. 28 Capsule 0    aspirin 81 mg chewable tablet Take 81 mg by mouth daily.  amLODIPine (NORVASC) 5 mg tablet Take 5 mg by mouth daily.  metFORMIN (GLUMETZA ER) 500 mg TG24 24 hour tablet Take 500 mg by mouth daily. Extended release      dilTIAZem ER (TIAZAC) 240 mg capsule Take 240 mg by mouth daily.  vit B Cmplx 3-FA-Vit C-Biotin (NEPHRO PANFILO RX) 1- mg-mg-mcg tablet Take 1 Tab by mouth daily.  simvastatin (ZOCOR) 20 mg tablet Take 20 mg by mouth nightly.          Past History     Past Medical History:  Past Medical History:   Diagnosis Date    CAD (coronary artery disease)     Diabetes ([address]     Hypertension        Past Surgical History:  Past Surgical History:   Procedure Laterality Date    IR INJ SINUS TRACT THERAP  1998       Family History:  Family History   Problem Relation Age of Onset    Hypertension Mother     Hypertension Father     Diabetes Father     Arrhythmia Sister     Hypertension Brother     Stroke Brother     Hypertension Sister        Social History:  Social History     Tobacco Use    Smoking status: Former Smoker     Types: Cigarettes     Quit date: 2010     Years since quittin.7    Smokeless tobacco: Never Used   Substance Use Topics    Alcohol use: Yes     Alcohol/week: 2.5 - 3.3 standard drinks     Types: 3 - 4 Standard drinks or equivalent per week     Comment: occasional    Drug use: No       Allergies:  No Known Allergies      Review of Systems   Review of Systems   Constitutional: Negative for activity change, chills and fever. HENT: Negative for congestion, ear pain, rhinorrhea and trouble swallowing. Eyes: Negative for pain and visual disturbance. Respiratory: Negative for cough and shortness of breath. Cardiovascular: Negative for chest pain. Gastrointestinal: Positive for abdominal pain. Negative for diarrhea, nausea and vomiting. Genitourinary: Positive for flank pain. Negative for decreased urine volume, difficulty urinating, dysuria and hematuria. Musculoskeletal: Negative for arthralgias and myalgias. Skin: Negative for rash. Neurological: Negative for weakness and headaches. Hematological: Negative for adenopathy. Psychiatric/Behavioral: The patient is not nervous/anxious. All other systems reviewed and are negative. Physical Exam   Physical Exam  Vitals and nursing note reviewed. Constitutional:       General: He is not in acute distress. Appearance: He is well-developed. He is not ill-appearing. HENT:      Head: Normocephalic and atraumatic. Mouth/Throat:      Mouth: Mucous membranes are moist.   Eyes:      Extraocular Movements: Extraocular movements intact. Pupils: Pupils are equal, round, and reactive to light. Cardiovascular:      Rate and Rhythm: Normal rate and regular rhythm. Heart sounds: Normal heart sounds.    Pulmonary:      Effort: Pulmonary effort is normal. No respiratory distress. Breath sounds: Normal breath sounds. Abdominal:      General: Abdomen is protuberant. Bowel sounds are normal.      Palpations: Abdomen is soft. Tenderness: There is abdominal tenderness in the right lower quadrant. There is right CVA tenderness. There is no guarding or rebound. Hernia: No hernia is present. Skin:     General: Skin is warm and dry. Capillary Refill: Capillary refill takes less than 2 seconds. Findings: No rash. Neurological:      General: No focal deficit present. Mental Status: He is alert. Cranial Nerves: No cranial nerve deficit. Psychiatric:         Mood and Affect: Mood normal.         Behavior: Behavior normal.         Diagnostic Study Results     Labs -     Recent Results (from the past 200 hour(s))   METABOLIC PANEL, COMPREHENSIVE    Collection Time: 09/14/21 11:04 AM   Result Value Ref Range    Sodium 134 (L) 136 - 145 mmol/L    Potassium 3.2 (L) 3.5 - 5.1 mmol/L    Chloride 102 97 - 108 mmol/L    CO2 24 21 - 32 mmol/L    Anion gap 8 5 - 15 mmol/L    Glucose 150 (H) 65 - 100 mg/dL    BUN 5 (L) 6 - 20 mg/dL    Creatinine 0.91 0.70 - 1.30 mg/dL    BUN/Creatinine ratio 5 (L) 12 - 20      GFR est AA >60 >60 ml/min/1.73m2    GFR est non-AA >60 >60 ml/min/1.73m2    Calcium 9.1 8.5 - 10.1 mg/dL    Bilirubin, total 0.7 0.2 - 1.0 mg/dL    AST (SGOT) 93 (H) 15 - 37 U/L    ALT (SGPT) 82 (H) 12 - 78 U/L    Alk.  phosphatase 86 45 - 117 U/L    Protein, total 9.4 (H) 6.4 - 8.2 g/dL    Albumin 3.6 3.5 - 5.0 g/dL    Globulin 5.8 (H) 2.0 - 4.0 g/dL    A-G Ratio 0.6 (L) 1.1 - 2.2     CBC WITH AUTOMATED DIFF    Collection Time: 09/14/21 11:04 AM   Result Value Ref Range    WBC 10.1 4.1 - 11.1 K/uL    RBC 4.57 4.10 - 5.70 M/uL    HGB 16.2 12.1 - 17.0 g/dL    HCT 46.4 36.6 - 50.3 %    .5 (H) 80.0 - 99.0 FL    MCH 35.4 (H) 26.0 - 34.0 PG    MCHC 34.9 30.0 - 36.5 g/dL    RDW 13.6 11.5 - 14.5 %    PLATELET 998 768 - 689 K/uL    MPV 11.8 8.9 - 12.9 FL    NRBC 0.0 0.0  WBC    ABSOLUTE NRBC 0.00 0.00 - 0.01 K/uL    NEUTROPHILS 60 32 - 75 %    LYMPHOCYTES 29 12 - 49 %    MONOCYTES 10 5 - 13 %    EOSINOPHILS 1 0 - 7 %    BASOPHILS 0 0 - 1 %    IMMATURE GRANULOCYTES 0 0 - 0.5 %    ABS. NEUTROPHILS 6.0 1.8 - 8.0 K/UL    ABS. LYMPHOCYTES 2.9 0.8 - 3.5 K/UL    ABS. MONOCYTES 1.0 0.0 - 1.0 K/UL    ABS. EOSINOPHILS 0.1 0.0 - 0.4 K/UL    ABS. BASOPHILS 0.0 0.0 - 0.1 K/UL    ABS. IMM.  GRANS. 0.0 0.00 - 0.04 K/UL    DF AUTOMATED     URINALYSIS W/MICROSCOPIC    Collection Time: 09/14/21 11:04 AM   Result Value Ref Range    Color Yellow/Straw      Appearance Clear Clear      Specific gravity 1.015 1.003 - 1.030      pH (UA) 6.0 5.0 - 8.0      Protein Negative Negative mg/dL    Glucose 50 (A) Negative mg/dL    Ketone 5 (A) Negative mg/dL    Bilirubin Negative Negative      Blood Negative Negative      Urobilinogen 0.1 0.1 - 1.0 EU/dL    Nitrites Negative Negative      Leukocyte Esterase Negative Negative      WBC 0-4 0 - 4 /hpf    RBC 0-5 0 - 5 /hpf    Bacteria Negative Negative /hpf    Mucus Trace /lpf   CULTURE, URINE    Collection Time: 09/14/21  4:30 PM    Specimen: Urine   Result Value Ref Range    Special Requests: No Special Requests      Culture result: No Growth (<1000 cfu/mL)     URINALYSIS W/MICROSCOPIC    Collection Time: 09/14/21  4:30 PM   Result Value Ref Range    Color Yellow/Straw      Appearance Turbid (A) Clear      Specific gravity >1.030 (H) 1.003 - 1.030    pH (UA) 5.0 5.0 - 8.0      Protein Negative Negative mg/dL    Glucose Negative Negative mg/dL    Ketone 20 (A) Negative mg/dL    Bilirubin Negative Negative      Blood Negative Negative      Urobilinogen 0.1 0.1 - 1.0 EU/dL    Nitrites Negative Negative      Leukocyte Esterase Negative Negative      WBC 0-4 0 - 4 /hpf    RBC 5-10 0 - 5 /hpf    Bacteria Negative Negative /hpf   GLUCOSE, POC    Collection Time: 09/14/21  5:47 PM   Result Value Ref Range    Glucose (POC) 184 (H) 65 - 117 mg/dL    Performed by Andrzej Arteaga, POC    Collection Time: 09/14/21  8:09 PM   Result Value Ref Range    Glucose (POC) 213 (H) 65 - 117 mg/dL    Performed by Ryan Nascimento, POC    Collection Time: 09/15/21  8:16 AM   Result Value Ref Range    Glucose (POC) 136 (H) 65 - 117 mg/dL    Performed by Dashawn Garner        Radiologic Studies -   XR Results (most recent):  Results from Hospital Encounter encounter on 09/13/20    XR CHEST PORT    Narrative  Single frontal view of the chest compared to prior exam dated 9/12/2020. Heart  size is normal. Lungs are clear of infiltrate. No pulmonary nodule or pleural  effusion is seen. No bony abnormalities are identified. Impression  Impression: No acute abnormality demonstrated, unchanged. CT Results  (Last 48 hours)    None        CT Results (most recent):  Results from Hospital Encounter encounter on 09/14/21    CT ABD PELV W CONT    Narrative  CT dose reduction was achieved through use of a standardized protocol tailored  for this examination and automatic exposure control for dose modulation. Contrast study shows clear lung bases    Liver, spleen, pancreas, gallbladder, adrenals and kidneys are normal. No small  bowel or vascular abnormality, lymphadenopathy or free fluid. Specifically, no  striated right nephrogram    Normal prostate. Bladder is empty. Distal colon is collapsed and proximal colon  shows some fatty atrophy of the wall    The base of the appendix contains a large appendicolith and there is generalized  thickening of the regional wall, and moderate fat stranding. There is some fluid  within the nondilated appendiceal tip. Wall is edematous, mainly about the  appendicolith. Some adjacent structures are drawn into the inflammation, with  effacement of fat planes. Several small regional lymph nodes. No fluid  collection.  I gave this report to the ordering practitioner    Impression  Mild to moderate appendicitis        Medical Decision Making and ED Course   I am the first provider for this patient. I reviewed the vital signs, available nursing notes, past medical history, past surgical history, family history and social history. Vital Signs-Reviewed the patient's vital signs. Wt Readings from Last 3 Encounters:   09/14/21 86.2 kg (190 lb)   09/14/20 90.7 kg (200 lb)   09/12/20 90.7 kg (200 lb)     Temp Readings from Last 3 Encounters:   09/15/21 97.7 °F (36.5 °C)   09/15/20 97.7 °F (36.5 °C)   09/12/20 98.3 °F (36.8 °C)     BP Readings from Last 3 Encounters:   09/15/21 (!) 155/85   09/15/20 (!) 152/95   09/12/20 (!) 174/102     Pulse Readings from Last 3 Encounters:   09/15/21 69   09/15/20 100   09/12/20 64       No data found. Records Reviewed: Nursing Notes and Old Medical Records    Provider Notes (Medical Decision Making):       MDM  Number of Diagnoses or Management Options  Appendicolith  Hypokalemia  Other acute appendicitis  Diagnosis management comments: 79-year-old male presenting with abdominal pain, nausea and vomiting. He is tender in the right lower quadrant of the abdomen. Differentials include appendicitis, diverticulitis, bowel obstruction, gastroenteritis, UTI, kidney stone. CT of the abdomen revealing appendicolith with an acute appendicitis without any evidence of abscess or gangrene. Surgery was consulted who will admit the patient to her service. Patient will remain n.p.o. at this time. He has been given fluids and Zosyn. Amount and/or Complexity of Data Reviewed  Clinical lab tests: ordered and reviewed  Tests in the radiology section of CPT®: ordered and reviewed  Review and summarize past medical records: yes          ED Course:   Initial assessment performed. The patients presenting problems have been discussed, and they are in agreement with the care plan formulated and outlined with them.   I have encouraged them to ask questions as they arise throughout their visit. Admit Note:  Pt is being admitted by Dr. Christie Craven, she is recommending NPO and IV Zosyn. The results of their tests and reason(s) for their admission have been discussed with pt and/or available family. They convey agreement and understanding for the need to be admitted and for admission diagnosis. Procedures       Amelia Halsted, PA-C    Procedures     Amelia Halsted, PA-C        Disposition     Disposition: Admitted to Floor Surgical Floor the case was discussed with the admitting physician Dr. Jose    Admitted       Diagnosis     Clinical Impression:   1. Other acute appendicitis    2. Appendicolith    3. Hypokalemia    4. Acute appendicitis with localized peritonitis, without perforation, abscess, or gangrene        Attestations:    Amelia Halsted, PA-C    Please note that this dictation was completed with [employer], the computer voice recognition software. Quite often unanticipated grammatical, syntax, homophones, and other interpretive errors are inadvertently transcribed by the computer software. Please disregard these errors. Please excuse any errors that have escaped final proofreading. Thank you.

## 2021-09-15 VITALS
OXYGEN SATURATION: 99 % | DIASTOLIC BLOOD PRESSURE: 85 MMHG | HEIGHT: 67 IN | RESPIRATION RATE: 16 BRPM | WEIGHT: 190 LBS | HEART RATE: 69 BPM | TEMPERATURE: 97.7 F | SYSTOLIC BLOOD PRESSURE: 155 MMHG | BODY MASS INDEX: 29.82 KG/M2

## 2021-09-15 LAB
APPEARANCE UR: ABNORMAL
BACTERIA URNS QL MICRO: NEGATIVE /HPF
BILIRUB UR QL: NEGATIVE
COLOR UR: ABNORMAL
GLUCOSE BLD STRIP.AUTO-MCNC: 136 MG/DL (ref 65–117)
GLUCOSE UR STRIP.AUTO-MCNC: NEGATIVE MG/DL
HGB UR QL STRIP: NEGATIVE
KETONES UR QL STRIP.AUTO: 20 MG/DL
LEUKOCYTE ESTERASE UR QL STRIP.AUTO: NEGATIVE
NITRITE UR QL STRIP.AUTO: NEGATIVE
PERFORMED BY, TECHID: ABNORMAL
PH UR STRIP: 5 [PH] (ref 5–8)
PROT UR STRIP-MCNC: NEGATIVE MG/DL
RBC #/AREA URNS HPF: ABNORMAL /HPF (ref 0–5)
SP GR UR REFRACTOMETRY: >1.03 (ref 1–1.03)
UROBILINOGEN UR QL STRIP.AUTO: 0.1 EU/DL (ref 0.1–1)
WBC URNS QL MICRO: ABNORMAL /HPF (ref 0–4)

## 2021-09-15 PROCEDURE — 74011250637 HC RX REV CODE- 250/637: Performed by: SURGERY

## 2021-09-15 PROCEDURE — 99024 POSTOP FOLLOW-UP VISIT: CPT | Performed by: SURGERY

## 2021-09-15 PROCEDURE — 82962 GLUCOSE BLOOD TEST: CPT

## 2021-09-15 RX ORDER — DOCUSATE SODIUM 100 MG/1
100 CAPSULE, LIQUID FILLED ORAL 2 TIMES DAILY
Qty: 28 CAPSULE | Refills: 0 | Status: SHIPPED | OUTPATIENT
Start: 2021-09-15 | End: 2021-09-29

## 2021-09-15 RX ORDER — HYDROCODONE BITARTRATE AND ACETAMINOPHEN 5; 325 MG/1; MG/1
1 TABLET ORAL
Qty: 10 TABLET | Refills: 0 | Status: SHIPPED | OUTPATIENT
Start: 2021-09-15 | End: 2021-09-18

## 2021-09-15 RX ORDER — METFORMIN HYDROCHLORIDE 500 MG/1
500 TABLET, EXTENDED RELEASE ORAL EVERY MORNING
Status: DISCONTINUED | OUTPATIENT
Start: 2021-09-16 | End: 2021-09-15 | Stop reason: HOSPADM

## 2021-09-15 RX ORDER — AMLODIPINE BESYLATE 5 MG/1
5 TABLET ORAL DAILY
Status: DISCONTINUED | OUTPATIENT
Start: 2021-09-15 | End: 2021-09-15 | Stop reason: HOSPADM

## 2021-09-15 RX ADMIN — ACETAMINOPHEN 650 MG: 325 TABLET ORAL at 05:31

## 2021-09-15 RX ADMIN — AMLODIPINE BESYLATE 5 MG: 5 TABLET ORAL at 08:50

## 2021-09-15 RX ADMIN — Medication 10 ML: at 05:31

## 2021-09-15 NOTE — PROGRESS NOTES
LOS skin assessment performed by Willie Grant and Rima OMER. Skin is dry and intact. Has Laproscopic incisions to umbilicus and left abdomen, dermabond intact to left abdomen, dressing intact to umbilicus dressing with small amount of shadowing noted.

## 2021-09-15 NOTE — DISCHARGE INSTRUCTIONS
Patient Education   Regular diet  You may shower, no baths or swimming for 2 weeks  Avoid heavy lifting  Take medications as prescribed, may use ibuprofen/Tylenol for pain as well     Appendectomy: What to Expect at [facility] doctor removed your appendix either by making many small cuts, called incisions, in your belly (laparoscopic surgery) or through open surgery. In open surgery, the doctor makes one large incision. The incisions leave scars that usually fade over time. After your surgery, it is normal to feel weak and tired for several days after you return home. Your belly may be swollen and may be painful. If you had laparoscopic surgery, you may have pain in your shoulder for about 24 hours. You may also feel sick to your stomach and have diarrhea, constipation, gas, or a headache. This usually goes away in a few days. Your recovery time depends on the type of surgery you had. If you had laparoscopic surgery, you will probably be able to return to work or a normal routine 1 to 3 weeks after surgery. If you had an open surgery, it may take 2 to 4 weeks. If your appendix ruptured, you may have a drain in your incision. Your body will work fine without an appendix. You won't have to make any changes in your diet or lifestyle. This care sheet gives you a general idea about how long it will take for you to recover. But each person recovers at a different pace. Follow the steps below to get better as quickly as possible. How can you care for yourself at home? Activity    · Rest when you feel tired. Getting enough sleep will help you recover.     · Try to walk each day. Start by walking a little more than you did the day before. Bit by bit, increase the amount you walk. Walking boosts blood flow and helps prevent pneumonia and constipation.     · For about 2 weeks, avoid lifting anything that would make you strain.  This may include a child, heavy grocery bags and milk containers, a heavy briefcase or backpack, cat litter or dog food bags, or a vacuum .     · Avoid strenuous activities, such as bicycle riding, jogging, weight lifting, or aerobic exercise, until your doctor says it is okay.     · You may be able to take showers (unless you have a drain near your incision) 24 to 48 hours after surgery. Pat the incision dry. Do not take a bath for the first 2 weeks, or until your doctor tells you it is okay. If you have a drain near your incision, follow your doctor's instructions.     · You may drive when you are no longer taking pain medicine and can quickly move your foot from the gas pedal to the brake. You must also be able to sit comfortably for a long period of time, even if you do not plan on going far. You might get caught in traffic.     · You will probably be able to go back to work in 1 to 3 weeks. If you had an open surgery, it may take 3 to 4 weeks.     · Your doctor will tell you when you can have sex again. Diet    · You can eat your normal diet. If your stomach is upset, try bland, low-fat foods like plain rice, broiled chicken, toast, and yogurt.     · Drink plenty of fluids (unless your doctor tells you not to).     · You may notice that your bowel movements are not regular right after your surgery. This is common. Try to avoid constipation and straining with bowel movements. You may want to take a fiber supplement every day. If you have not had a bowel movement after a couple of days, ask your doctor about taking a mild laxative. Medicines    · Your doctor will tell you if and when you can restart your medicines. He or she will also give you instructions about taking any new medicines.     · If you take aspirin or some other blood thinner, ask your doctor if and when to start taking it again. Make sure that you understand exactly what your doctor wants you to do.     · If your appendix ruptured, you will need to take antibiotics. Take them as directed.  Do not stop taking them just because you feel better. You need to take the full course of antibiotics.     · Be safe with medicines. Take pain medicines exactly as directed. ? If the doctor gave you a prescription medicine for pain, take it as prescribed. ? If you are not taking a prescription pain medicine, take an over-the-counter medicine such as acetaminophen (Tylenol), ibuprofen (Advil, Motrin), or naproxen (Aleve). Read and follow all instructions on the label. ? Do not take two or more pain medicines at the same time unless the doctor told you to. Many pain medicines have acetaminophen, which is Tylenol. Too much Tylenol can be harmful.     · If you think your pain medicine is making you sick to your stomach:  ? Take your medicine after meals (unless your doctor has told you not to). ? Ask your doctor for a different pain medicine. Incision care    · If you had an open surgery, you may have staples in your incision. The doctor will take these out in 7 to 10 days.     · If you have strips of tape on the incision, leave the tape on for a week or until it falls off.     · You may wash the area with warm, soapy water 24 to 48 hours after your surgery, unless your doctor tells you not to. Pat the area dry.     · Keep the area clean and dry. You may cover it with a gauze bandage if it weeps or rubs against clothing. Change the bandage every day.     · If your appendix ruptured, you may have an incision with packing in it. Change the packing as often as your doctor tells you to. ? Packing changes may hurt at first. Taking pain medicine about half an hour before you change the dressing can help. ? If your dressing sticks to your wound, try soaking it with warm water for about 10 minutes before you remove it. You can do this in the shower or by placing a wet washcloth over the dressing. ? Remove the old packing and flush the incision with water. Gently pat the top area dry. ?  The size of the incision determines how much gauze you need to put inside. Fold the gauze over once, but do not wad it up so that it hurts. Put it in the wound carefully. You want to keep the sides of the wound from touching. A cotton swab may help you push the gauze in as needed. ? Put a gauze pad over the wound, and tape it down. ? You may notice greenish gray fluid seeping from your wound as you start to heal. This is normal. It is a sign that your wound is healing. Follow-up care is a key part of your treatment and safety. Be sure to make and go to all appointments, and call your doctor if you are having problems. It's also a good idea to know your test results and keep a list of the medicines you take. When should you call for help? Call 911 anytime you think you may need emergency care. For example, call if:    · You passed out (lost consciousness).     · You are short of breath. .   Call your doctor now or seek immediate medical care if:    · You are sick to your stomach and cannot drink fluids.     · You cannot pass stools or gas.     · You have pain that does not get better when you take your pain medicine.     · You have signs of infection, such as:  ? Increased pain, swelling, warmth, or redness. ? Red streaks leading from the wound. ? Pus draining from the wound. ? A fever.     · You have loose stitches, or your incision comes open.     · Bright red blood has soaked through the bandage over your incision.     · You have signs of a blood clot in your leg (called a deep vein thrombosis), such as:  ? Pain in your calf, back of knee, thigh, or groin. ? Redness and swelling in your leg or groin. Watch closely for any changes in your health, and be sure to contact your doctor if you have any problems. Where can you learn more? Go to http://www.gray.com/  Enter X801 in the search box to learn more about \"Appendectomy: What to Expect at Home. \"  Current as of: April 15, 2020               Content Version: 12.8  © 4092-5375 [employer]. Care instructions adapted under license by [employer] (which disclaims liability or warranty for this information). If you have questions about a medical condition or this instruction, always ask your healthcare professional. [address] any warranty or liability for your use of this information.

## 2021-09-15 NOTE — DISCHARGE SUMMARY
Surgery Discharge Summary    Admit Date:   9/14/2021     Admitting Provider:  Devin Jose DO    Date of Discharge:  9/15/2021    Admission Diagnoses:  Appendicitis [K37]    Discharge Diagnoses:  Hospital Problems as of 9/15/2021 Date Reviewed: 9/14/2021        Codes Class Noted - Resolved POA    Appendicitis ICD-10-CM: K37  ICD-9-CM: 035  9/14/2021 - Present Unknown              Hospital Course:   Patient is a 42-year-old male who presented to the emergency department with abdominal pain and was diagnosed with appendicitis. Patient underwent laparoscopic appendectomy on 9/14/2021. Operative course was uneventful. Postoperatively, patient was transferred to the floor where his pain and nausea were well controlled with medications and his diet was advanced. Patient was seen and evaluated on postoperative day 1 was found to be voiding adequately and tolerating a regular diet. He was then deemed stable for discharge. Patient was given instructions for discharge and prescriptions were sent to the patient's pharmacy, and he was instructed to follow-up in the clinic in 2 weeks. All questions were answered to his apparent satisfaction.     Procedures Performed:  Procedure(s):  APPENDECTOMY LAPAROSCOPIC      Consults:  None    Significant Diagnostic Studies:  labs: normal and radiology: CT scan: appendicitis    Discharge Exam:  General: alert, oriented, in no acute distress  Neck: supple, no masses, no JVD  Cardiovascular: regular rate and rhythm  Pulmonary: unlabored breathing, equal chest rise bilaterally, no wheezing or rhonchi  Abdomen: soft, appropriately tender, non distended, incisions c/d/i  Extremities: no swelling, pulses equal and palpable in all extremities      Discharge Condition:   good    Disposition:  Home    Discharge Medications:  Current Discharge Medication List      START taking these medications    Details   HYDROcodone-acetaminophen (Norco) 5-325 mg per tablet Take 1 Tablet by mouth every six (6) hours as needed for Pain for up to 3 days. Max Daily Amount: 4 Tablets. Qty: 10 Tablet, Refills: 0  Start date: 9/15/2021, End date: 9/18/2021    Associated Diagnoses: Acute appendicitis with localized peritonitis, without perforation, abscess, or gangrene      docusate sodium (COLACE) 100 mg capsule Take 1 Capsule by mouth two (2) times a day for 14 days. Qty: 28 Capsule, Refills: 0  Start date: 9/15/2021, End date: 9/29/2021         CONTINUE these medications which have NOT CHANGED    Details   aspirin 81 mg chewable tablet Take 81 mg by mouth daily. amLODIPine (NORVASC) 5 mg tablet Take 5 mg by mouth daily. metFORMIN (GLUMETZA ER) 500 mg TG24 24 hour tablet Take 500 mg by mouth daily. Extended release      dilTIAZem ER (TIAZAC) 240 mg capsule Take 240 mg by mouth daily. vit B Cmplx 3-FA-Vit C-Biotin (NEPHRO PANFILO RX) 1- mg-mg-mcg tablet Take 1 Tab by mouth daily. simvastatin (ZOCOR) 20 mg tablet Take 20 mg by mouth nightly. Follow-up Care/Patient Instructions:   Activity: no heavy lifting for 4 weeks  Diet: Regular Diet  Wound Care: None needed    Follow-up Information     Follow up With Specialties Details Why Contact Info    Debbie Jose DO Surgery Schedule an appointment as soon as possible for a visit in 2 weeks post op follow up [address] [facility]  [phone]      [facility], [address] [place] [address]  [phone]            Signed:  Debbie Jose DO  9/15/2021  8:12 AM

## 2021-09-15 NOTE — PROGRESS NOTES
Problem: Falls - Risk of  Goal: *Absence of Falls  Description: Document Federico Irby Fall Risk and appropriate interventions in the flowsheet.   Outcome: Progressing Towards Goal  Note: Fall Risk Interventions:            Medication Interventions: Bed/chair exit alarm                   Problem: Patient Education: Go to Patient Education Activity  Goal: Patient/Family Education  Outcome: Progressing Towards Goal

## 2021-09-15 NOTE — PROGRESS NOTES
Patient discharged home/self care. Discharge instructions and medications reviewed with patient. Follow up appointments reviewed with patient. Vital signs stable, no pain. IV removed. Discharge plan of care/case management plan validated with provider discharge order.

## 2021-09-16 LAB
BACTERIA SPEC CULT: NORMAL
SPECIAL REQUESTS,SREQ: NORMAL

## 2022-03-18 PROBLEM — K37 APPENDICITIS: Status: ACTIVE | Noted: 2021-09-14

## 2022-03-19 PROBLEM — E87.6 HYPOKALEMIA: Status: ACTIVE | Noted: 2020-09-15

## 2022-03-19 PROBLEM — R55 SYNCOPAL EPISODES: Status: ACTIVE | Noted: 2020-09-13

## 2022-03-19 PROBLEM — I10 HTN (HYPERTENSION): Status: ACTIVE | Noted: 2020-09-13

## 2022-03-20 PROBLEM — E11.9 DIABETES MELLITUS TYPE 2, CONTROLLED (HCC): Status: ACTIVE | Noted: 2020-09-13

## 2022-12-24 ENCOUNTER — HOSPITAL ENCOUNTER (EMERGENCY)
Age: 62
Discharge: HOME | End: 2022-12-24
Attending: STUDENT IN AN ORGANIZED HEALTH CARE EDUCATION/TRAINING PROGRAM
Payer: COMMERCIAL

## 2022-12-24 VITALS
WEIGHT: 174 LBS | RESPIRATION RATE: 17 BRPM | DIASTOLIC BLOOD PRESSURE: 91 MMHG | TEMPERATURE: 98 F | SYSTOLIC BLOOD PRESSURE: 141 MMHG | HEIGHT: 67 IN | OXYGEN SATURATION: 98 % | BODY MASS INDEX: 27.31 KG/M2 | HEART RATE: 82 BPM

## 2022-12-24 DIAGNOSIS — K59.00 CONSTIPATION, UNSPECIFIED CONSTIPATION TYPE: Primary | ICD-10-CM

## 2022-12-24 PROCEDURE — 99283 EMERGENCY DEPT VISIT LOW MDM: CPT

## 2022-12-24 RX ORDER — POLYETHYLENE GLYCOL 3350 17 G/17G
17 POWDER, FOR SOLUTION ORAL DAILY
Qty: 14 PACKET | Refills: 0 | Status: SHIPPED | OUTPATIENT
Start: 2022-12-24 | End: 2023-01-07

## 2022-12-24 NOTE — ED PROVIDER NOTES
EMERGENCY DEPARTMENT HISTORY AND PHYSICAL EXAM      Date: 2022  Patient Name: Harriet Dubose    History of Presenting Illness     Chief Complaint   Patient presents with    Constipation       History Provided By: Patient    HPI: Harriet Dubose, 63 y.o. male with past medical history as reviewed below presents for evaluation of constipation. Patient states that he usually has 1 bowel movement daily. He did not have a bowel movement yesterday and has not had 1 today. He reports that this sometimes happens, and has been happening more frequently since his appendectomy. He denies any other intra-abdominal surgeries. He endorses slight nausea when straining, but is otherwise tolerating p.o. He continues to pass flatus. He took a docusate yesterday but has not had any relief in his symptoms. No significant abdominal pain, no other complaints. There are no other complaints, changes, or physical findings at this time. Past History     Past Medical History:  Past Medical History:   Diagnosis Date    CAD (coronary artery disease)     Diabetes ([address]     Hypertension        Past Surgical History:  Past Surgical History:   Procedure Laterality Date    IR INJ SINUS TRACT THERAP         Family History:  Family History   Problem Relation Age of Onset    Hypertension Mother     Hypertension Father     Diabetes Father     Arrhythmia Sister     Hypertension Brother     Stroke Brother     Hypertension Sister        Social History:  Social History     Tobacco Use    Smoking status: Former     Types: Cigarettes     Quit date: 2010     Years since quittin.9    Smokeless tobacco: Never   Substance Use Topics    Alcohol use:  Yes     Alcohol/week: 2.5 - 3.3 standard drinks     Types: 3 - 4 Standard drinks or equivalent per week     Comment: occasional    Drug use: No       Allergies:  No Known Allergies    PCP: Wily Hayward MD    No current facility-administered medications on file prior to encounter. Current Outpatient Medications on File Prior to Encounter   Medication Sig Dispense Refill    aspirin 81 mg chewable tablet Take 81 mg by mouth daily. amLODIPine (NORVASC) 5 mg tablet Take 5 mg by mouth daily. metFORMIN (GLUMETZA ER) 500 mg TG24 24 hour tablet Take 500 mg by mouth daily. Extended release      dilTIAZem ER (TIAZAC) 240 mg capsule Take 240 mg by mouth daily. vit B Cmplx 3-FA-Vit C-Biotin (NEPHRO PANFILO RX) 1- mg-mg-mcg tablet Take 1 Tab by mouth daily. simvastatin (ZOCOR) 20 mg tablet Take 20 mg by mouth nightly. Review of Systems   Review of Systems   Constitutional:  Negative for fever. HENT:  Negative for congestion. Respiratory:  Negative for cough and shortness of breath. Cardiovascular:  Negative for chest pain. Gastrointestinal:  Positive for constipation and nausea. Negative for abdominal pain and vomiting. Genitourinary:  Negative for dysuria. Musculoskeletal:  Negative for arthralgias and myalgias. Skin:  Negative for rash. Allergic/Immunologic: Negative for immunocompromised state. Neurological:  Negative for syncope. Psychiatric/Behavioral:  Negative for confusion. Physical Exam   Physical Exam  Vitals reviewed. Constitutional:       General: He is not in acute distress. Appearance: He is not toxic-appearing. HENT:      Head: Normocephalic and atraumatic. Eyes:      Extraocular Movements: Extraocular movements intact. Pupils: Pupils are equal, round, and reactive to light. Cardiovascular:      Rate and Rhythm: Normal rate and regular rhythm. Heart sounds: Normal heart sounds. Pulmonary:      Effort: Pulmonary effort is normal.      Breath sounds: Normal breath sounds. Abdominal:      Palpations: Abdomen is soft. Tenderness: There is no abdominal tenderness. Musculoskeletal:      Right lower leg: No edema. Left lower leg: No edema. Skin:     General: Skin is warm and dry. Neurological:      General: No focal deficit present. Mental Status: He is alert. Psychiatric:         Mood and Affect: Mood normal.         Behavior: Behavior normal.       Lab and Diagnostic Study Results   Labs -   No results found for this or any previous visit (from the past 12 hour(s)). Radiologic Studies -   @lastxrresult@  CT Results  (Last 48 hours)      None          CXR Results  (Last 48 hours)      None            Medical Decision Making and ED Course   Differential Diagnosis & Medical Decision Making Provider Note:   21-year-old male presents for evaluation of constipation. Physical exam benign, abdomen is soft, patient is nontoxic. Do not suspect bowel obstruction given soft abdomen and continuance of passing flatus as well as tolerating p.o. Will perform enema here for stool softening and will prescribe MiraLAX for home. Return precautions given for any signs concerning for possible obstruction or impaction    - I am the first provider for this patient. I reviewed the vital signs, available nursing notes, past medical history, past surgical history, family history and social history. The patients presenting problems have been discussed, and they are in agreement with the care plan formulated and outlined with them. I have encouraged them to ask questions as they arise throughout their visit. Vital Signs-Reviewed the patient's vital signs. Patient Vitals for the past 12 hrs:   Temp Pulse Resp BP SpO2   12/24/22 1223 98 °F (36.7 °C) 82 17 (!) 141/91 98 %       ED Course:   ED Course as of 12/24/22 1406   Sat Dec 24, 2022   1404 Successful bowel movement with enema [BQ]      ED Course User Index  [BQ] Orin Lopez MD         Procedures   Performed by: Starla Reid MD  Procedures      Disposition   Disposition: DC- Adult Discharges: All of the diagnostic tests were reviewed and questions answered. Diagnosis, care plan and treatment options were discussed.   The patient understands the instructions and will follow up as directed. The patients results have been reviewed with them. They have been counseled regarding their diagnosis. The patient verbally convey understanding and agreement of the signs, symptoms, diagnosis, treatment and prognosis and additionally agrees to follow up as recommended with their PCP in 24 - 48 hours. They also agree with the care-plan and convey that all of their questions have been answered. I have also put together some discharge instructions for them that include: 1) educational information regarding their diagnosis, 2) how to care for their diagnosis at home, as well a 3) list of reasons why they would want to return to the ED prior to their follow-up appointment, should their condition change. DISCHARGE PLAN:  1. Current Discharge Medication List        START taking these medications    Details   polyethylene glycol (Miralax) 17 gram packet Take 1 Packet by mouth daily for 14 days. Qty: 14 Packet, Refills: 0           CONTINUE these medications which have NOT CHANGED    Details   aspirin 81 mg chewable tablet Take 81 mg by mouth daily. amLODIPine (NORVASC) 5 mg tablet Take 5 mg by mouth daily. metFORMIN (GLUMETZA ER) 500 mg TG24 24 hour tablet Take 500 mg by mouth daily. Extended release      dilTIAZem ER (TIAZAC) 240 mg capsule Take 240 mg by mouth daily. vit B Cmplx 3-FA-Vit C-Biotin (NEPHRO PANFILO RX) 1- mg-mg-mcg tablet Take 1 Tab by mouth daily. simvastatin (ZOCOR) 20 mg tablet Take 20 mg by mouth nightly. 2.   Follow-up Information       Follow up With Specialties Details Why Contact Info    Tori Lucero MD Family Medicine In 1 week  [address] [place] [address]  [phone]            3. Return to ED if worse   4.    Current Discharge Medication List        START taking these medications    Details   polyethylene glycol (Miralax) 17 gram packet Take 1 Packet by mouth daily for 14 days.  Viki Manley: 14 Packet, Refills: 0  Start date: 12/24/2022, End date: 1/7/2023             Diagnosis/Clinical Impression     Clinical Impression:   1. Constipation, unspecified constipation type        Attestations: Gita Diaz MD, am the primary clinician of record. Please note that this dictation was completed with [employer], the computer voice recognition software. Quite often unanticipated grammatical, syntax, homophones, and other interpretive errors are inadvertently transcribed by the computer software. Please disregard these errors. Please excuse any errors that have escaped final proofreading. Thank you.

## 2023-05-23 RX ORDER — AMLODIPINE BESYLATE 5 MG/1
5 TABLET ORAL DAILY
COMMUNITY

## 2023-05-23 RX ORDER — METFORMIN HYDROCHLORIDE 500 MG/1
500 TABLET, FILM COATED, EXTENDED RELEASE ORAL DAILY
COMMUNITY

## 2023-05-23 RX ORDER — ASPIRIN 81 MG/1
81 TABLET, CHEWABLE ORAL DAILY
COMMUNITY

## 2023-05-23 RX ORDER — DILTIAZEM HYDROCHLORIDE 240 MG/1
240 CAPSULE, EXTENDED RELEASE ORAL DAILY
COMMUNITY

## 2023-05-23 RX ORDER — SIMVASTATIN 20 MG
20 TABLET ORAL NIGHTLY
COMMUNITY

## 2025-06-14 ENCOUNTER — HOSPITAL ENCOUNTER (EMERGENCY)
Facility: HOSPITAL | Age: 65
Discharge: HOME | End: 2025-06-14
Attending: STUDENT IN AN ORGANIZED HEALTH CARE EDUCATION/TRAINING PROGRAM
Payer: MEDICARE

## 2025-06-14 ENCOUNTER — HOSPITAL ENCOUNTER (EMERGENCY)
Facility: HOSPITAL | Age: 65
Discharge: HOME | End: 2025-06-14
Payer: MEDICARE

## 2025-06-14 ENCOUNTER — APPOINTMENT (OUTPATIENT)
Facility: HOSPITAL | Age: 65
End: 2025-06-14
Payer: MEDICARE

## 2025-06-14 VITALS
HEART RATE: 72 BPM | HEIGHT: 67 IN | OXYGEN SATURATION: 100 % | RESPIRATION RATE: 18 BRPM | DIASTOLIC BLOOD PRESSURE: 78 MMHG | SYSTOLIC BLOOD PRESSURE: 142 MMHG | TEMPERATURE: 98.1 F | BODY MASS INDEX: 26.68 KG/M2 | WEIGHT: 170 LBS

## 2025-06-14 VITALS
SYSTOLIC BLOOD PRESSURE: 193 MMHG | BODY MASS INDEX: 26.68 KG/M2 | HEIGHT: 67 IN | RESPIRATION RATE: 11 BRPM | OXYGEN SATURATION: 100 % | WEIGHT: 170 LBS | TEMPERATURE: 98.3 F | DIASTOLIC BLOOD PRESSURE: 98 MMHG | HEART RATE: 66 BPM

## 2025-06-14 DIAGNOSIS — E87.6 HYPOKALEMIA: ICD-10-CM

## 2025-06-14 DIAGNOSIS — R42 LIGHTHEADEDNESS: Primary | ICD-10-CM

## 2025-06-14 DIAGNOSIS — E83.42 HYPOMAGNESEMIA: ICD-10-CM

## 2025-06-14 DIAGNOSIS — F41.0 ANXIETY ATTACK: Primary | ICD-10-CM

## 2025-06-14 LAB
ALBUMIN SERPL-MCNC: 3.5 G/DL (ref 3.5–5)
ALBUMIN/GLOB SERPL: 0.8 (ref 1.1–2.2)
ALP SERPL-CCNC: 116 U/L (ref 45–117)
ALT SERPL-CCNC: 49 U/L (ref 12–78)
ANION GAP SERPL CALC-SCNC: 11 MMOL/L (ref 2–12)
AST SERPL W P-5'-P-CCNC: 114 U/L (ref 15–37)
BASOPHILS # BLD: 0.04 K/UL (ref 0–0.1)
BASOPHILS NFR BLD: 0.8 % (ref 0–1)
BILIRUB SERPL-MCNC: 1.9 MG/DL (ref 0.2–1)
BUN SERPL-MCNC: 3 MG/DL (ref 6–20)
BUN/CREAT SERPL: 3 (ref 12–20)
CA-I BLD-MCNC: 8.8 MG/DL (ref 8.5–10.1)
CHLORIDE SERPL-SCNC: 106 MMOL/L (ref 97–108)
CO2 SERPL-SCNC: 26 MMOL/L (ref 21–32)
CREAT SERPL-MCNC: 1.15 MG/DL (ref 0.7–1.3)
DIFFERENTIAL METHOD BLD: ABNORMAL
EOSINOPHIL # BLD: 0.11 K/UL (ref 0–0.4)
EOSINOPHIL NFR BLD: 2.3 % (ref 0–7)
ERYTHROCYTE [DISTWIDTH] IN BLOOD BY AUTOMATED COUNT: 14.8 % (ref 11.5–14.5)
GLOBULIN SER CALC-MCNC: 4.6 G/DL (ref 2–4)
GLUCOSE SERPL-MCNC: 197 MG/DL (ref 65–100)
HCT VFR BLD AUTO: 41.4 % (ref 36.6–50.3)
HGB BLD-MCNC: 14.8 G/DL (ref 12.1–17)
IMM GRANULOCYTES # BLD AUTO: 0.01 K/UL (ref 0–0.04)
IMM GRANULOCYTES NFR BLD AUTO: 0.2 % (ref 0–0.5)
LYMPHOCYTES # BLD: 2.79 K/UL (ref 0.8–3.5)
LYMPHOCYTES NFR BLD: 58.1 % (ref 12–49)
MAGNESIUM SERPL-MCNC: 1.3 MG/DL (ref 1.6–2.4)
MCH RBC QN AUTO: 35.3 PG (ref 26–34)
MCHC RBC AUTO-ENTMCNC: 35.7 G/DL (ref 30–36.5)
MCV RBC AUTO: 98.8 FL (ref 80–99)
MONOCYTES # BLD: 0.44 K/UL (ref 0–1)
MONOCYTES NFR BLD: 9.2 % (ref 5–13)
NEUTS SEG # BLD: 1.41 K/UL (ref 1.8–8)
NEUTS SEG NFR BLD: 29.4 % (ref 32–75)
NRBC # BLD: 0 K/UL (ref 0–0.01)
NRBC BLD-RTO: 0 PER 100 WBC
PLATELET # BLD AUTO: 130 K/UL (ref 150–400)
PMV BLD AUTO: 10.7 FL (ref 8.9–12.9)
POTASSIUM SERPL-SCNC: 3.4 MMOL/L (ref 3.5–5.1)
PROT SERPL-MCNC: 8.1 G/DL (ref 6.4–8.2)
RBC # BLD AUTO: 4.19 M/UL (ref 4.1–5.7)
SODIUM SERPL-SCNC: 143 MMOL/L (ref 136–145)
WBC # BLD AUTO: 4.8 K/UL (ref 4.1–11.1)

## 2025-06-14 PROCEDURE — 6370000000 HC RX 637 (ALT 250 FOR IP): Performed by: NURSE PRACTITIONER

## 2025-06-14 PROCEDURE — 6370000000 HC RX 637 (ALT 250 FOR IP): Performed by: STUDENT IN AN ORGANIZED HEALTH CARE EDUCATION/TRAINING PROGRAM

## 2025-06-14 PROCEDURE — 99284 EMERGENCY DEPT VISIT MOD MDM: CPT

## 2025-06-14 PROCEDURE — 80053 COMPREHEN METABOLIC PANEL: CPT

## 2025-06-14 PROCEDURE — 85025 COMPLETE CBC W/AUTO DIFF WBC: CPT

## 2025-06-14 PROCEDURE — 96366 THER/PROPH/DIAG IV INF ADDON: CPT

## 2025-06-14 PROCEDURE — 93005 ELECTROCARDIOGRAM TRACING: CPT | Performed by: STUDENT IN AN ORGANIZED HEALTH CARE EDUCATION/TRAINING PROGRAM

## 2025-06-14 PROCEDURE — 99285 EMERGENCY DEPT VISIT HI MDM: CPT

## 2025-06-14 PROCEDURE — 6360000002 HC RX W HCPCS: Performed by: NURSE PRACTITIONER

## 2025-06-14 PROCEDURE — 83735 ASSAY OF MAGNESIUM: CPT

## 2025-06-14 PROCEDURE — 71045 X-RAY EXAM CHEST 1 VIEW: CPT

## 2025-06-14 PROCEDURE — 96365 THER/PROPH/DIAG IV INF INIT: CPT

## 2025-06-14 PROCEDURE — 70450 CT HEAD/BRAIN W/O DYE: CPT

## 2025-06-14 RX ORDER — MAGNESIUM SULFATE IN WATER 40 MG/ML
2000 INJECTION, SOLUTION INTRAVENOUS
Status: COMPLETED | OUTPATIENT
Start: 2025-06-14 | End: 2025-06-14

## 2025-06-14 RX ORDER — POTASSIUM CHLORIDE 750 MG/1
40 TABLET, EXTENDED RELEASE ORAL ONCE
Status: COMPLETED | OUTPATIENT
Start: 2025-06-14 | End: 2025-06-14

## 2025-06-14 RX ORDER — HYDROXYZINE HYDROCHLORIDE 25 MG/1
25 TABLET, FILM COATED ORAL ONCE
Status: COMPLETED | OUTPATIENT
Start: 2025-06-14 | End: 2025-06-14

## 2025-06-14 RX ORDER — ONDANSETRON 4 MG/1
4 TABLET, ORALLY DISINTEGRATING ORAL 3 TIMES DAILY PRN
Qty: 9 TABLET | Refills: 0 | Status: SHIPPED | OUTPATIENT
Start: 2025-06-14 | End: 2025-06-17

## 2025-06-14 RX ORDER — ONDANSETRON 4 MG/1
4 TABLET, ORALLY DISINTEGRATING ORAL
Status: COMPLETED | OUTPATIENT
Start: 2025-06-14 | End: 2025-06-14

## 2025-06-14 RX ADMIN — MAGNESIUM SULFATE HEPTAHYDRATE 2000 MG: 40 INJECTION, SOLUTION INTRAVENOUS at 12:49

## 2025-06-14 RX ADMIN — POTASSIUM CHLORIDE 40 MEQ: 750 TABLET, FILM COATED, EXTENDED RELEASE ORAL at 12:44

## 2025-06-14 RX ADMIN — ONDANSETRON 4 MG: 4 TABLET, ORALLY DISINTEGRATING ORAL at 17:32

## 2025-06-14 RX ADMIN — HYDROXYZINE HYDROCHLORIDE 25 MG: 25 TABLET ORAL at 17:32

## 2025-06-14 ASSESSMENT — LIFESTYLE VARIABLES
HOW OFTEN DO YOU HAVE A DRINK CONTAINING ALCOHOL: NEVER
HOW MANY STANDARD DRINKS CONTAINING ALCOHOL DO YOU HAVE ON A TYPICAL DAY: PATIENT DOES NOT DRINK
HOW OFTEN DO YOU HAVE A DRINK CONTAINING ALCOHOL: PATIENT DECLINED
HOW MANY STANDARD DRINKS CONTAINING ALCOHOL DO YOU HAVE ON A TYPICAL DAY: PATIENT DECLINED

## 2025-06-14 ASSESSMENT — PAIN - FUNCTIONAL ASSESSMENT
PAIN_FUNCTIONAL_ASSESSMENT: NONE - DENIES PAIN

## 2025-06-14 NOTE — ED PROVIDER NOTES
[facility] EMERGENCY DEPT  EMERGENCY DEPARTMENT HISTORY AND PHYSICAL EXAM      Date of evaluation: 6/14/2025  Patient Name: Guero Sena  Birthdate 1960  MRN: [id number]  ED Provider: GENA Partida NP   Note Started: 11:52 AM EDT 6/14/25    HISTORY OF PRESENT ILLNESS     Chief Complaint   Patient presents with    Anxiety       History Provided By: Patient, only     HPI: Guero Sena is a 64 y.o. male with past medical history as listed below presents to the ER for anxiety.  Patient took half of his normal BP meds and states he felt funny describing anxiety patient feeling better by the time of history.  Patient comes in for evaluation.    PAST MEDICAL HISTORY   Past Medical History:  Past Medical History:   Diagnosis Date    CAD (coronary artery disease)     Diabetes (HCC)     Hypertension        Past Surgical History:  Past Surgical History:   Procedure Laterality Date    IR INJ SINOGRAM THERAPEUTIC  1998       Family History:  Family History   Problem Relation Age of Onset    Hypertension Mother     Hypertension Father     Diabetes Father     Hypertension Sister     Hypertension Brother     Stroke Brother     Arrhythmia Sister        Social History:  Social History     Tobacco Use    Smoking status: Former     Current packs/day: 0.00     Types: Cigarettes     Quit date: 1/1/2010     Years since quitting: 15.4    Smokeless tobacco: Never   Substance Use Topics    Alcohol use: Yes     Alcohol/week: 2.5 - 3.3 standard drinks of alcohol    Drug use: No       Allergies:  No Known Allergies    PCP: Mika Alcala MD    Current Meds:   Current Facility-Administered Medications   Medication Dose Route Frequency Provider Last Rate Last Admin    magnesium sulfate 2000 mg in 50 mL IVPB premix  2,000 mg IntraVENous NOW Macario De La Torre APRN - NP 25 mL/hr at 06/14/25 1249 2,000 mg at 06/14/25 1249     Current Outpatient Medications   Medication Sig Dispense Refill    amLODIPine (NORVASC) 5 MG tablet Take 1      amLODIPine 5 MG tablet  Commonly known as: NORVASC     aspirin 81 MG chewable tablet     dilTIAZem 240 MG extended release capsule  Commonly known as: TIAZAC     metFORMIN (MOD) 500 MG extended release tablet  Commonly known as: GLUMETZA     simvastatin 20 MG tablet  Commonly known as: ZOCOR                DISCONTINUED MEDICATIONS:  Current Discharge Medication List          I am the Primary Clinician of Record. GENA Partida - NP (electronically signed)    (Please note that parts of this dictation were completed with voice recognition software. Quite often unanticipated grammatical, syntax, homophones, and other interpretive errors are inadvertently transcribed by the computer software. Please disregards these errors. Please excuse any errors that have escaped final proofreading.)     Macario De La Torre APRN - NP  06/14/25 1945

## 2025-06-14 NOTE — DISCHARGE INSTRUCTIONS
Thank you for choosing our Emergency Department for your care.  It is our privilege to care for you in your time of need.  In the next several days, you may receive a survey via email or mailed to your home about your experience with our team.  We would greatly appreciate you taking a few minutes to complete the survey, as we use this information to learn what we have done well and what we could be doing better. Thank you for trusting us with your care!    Below you will find a list of your tests from today's visit.   Labs and Radiology Studies  Recent Results (from the past 12 hours)   CBC with Auto Differential    Collection Time: 06/14/25 11:26 AM   Result Value Ref Range    WBC 4.8 4.1 - 11.1 K/uL    RBC 4.19 4.10 - 5.70 M/uL    Hemoglobin 14.8 12.1 - 17.0 g/dL    Hematocrit 41.4 36.6 - 50.3 %    MCV 98.8 80.0 - 99.0 FL    MCH 35.3 (H) 26.0 - 34.0 PG    MCHC 35.7 30.0 - 36.5 g/dL    RDW 14.8 (H) 11.5 - 14.5 %    Platelets 130 (L) 150 - 400 K/uL    MPV 10.7 8.9 - 12.9 FL    Nucleated RBCs 0.0 0.0  WBC    nRBC 0.00 0.00 - 0.01 K/uL    Neutrophils % 29.4 (L) 32.0 - 75.0 %    Lymphocytes % 58.1 (H) 12.0 - 49.0 %    Monocytes % 9.2 5.0 - 13.0 %    Eosinophils % 2.3 0.0 - 7.0 %    Basophils % 0.8 0.0 - 1.0 %    Immature Granulocytes % 0.2 0 - 0.5 %    Neutrophils Absolute 1.41 (L) 1.80 - 8.00 K/UL    Lymphocytes Absolute 2.79 0.80 - 3.50 K/UL    Monocytes Absolute 0.44 0.00 - 1.00 K/UL    Eosinophils Absolute 0.11 0.00 - 0.40 K/UL    Basophils Absolute 0.04 0.00 - 0.10 K/UL    Immature Granulocytes Absolute 0.01 0.00 - 0.04 K/UL    Differential Type AUTOMATED     Comprehensive Metabolic Panel    Collection Time: 06/14/25 11:26 AM   Result Value Ref Range    Sodium 143 136 - 145 mmol/L    Potassium 3.4 (L) 3.5 - 5.1 mmol/L    Chloride 106 97 - 108 mmol/L    CO2 26 21 - 32 mmol/L    Anion Gap 11 2 - 12 mmol/L    Glucose 197 (H) 65 - 100 mg/dL    BUN 3 (L) 6 - 20 mg/dL    Creatinine 1.15 0.70 - 1.30 mg/dL     BUN/Creatinine Ratio 3 (L) 12 - 20      Est, Glom Filt Rate 72 >60 ml/min/1.73m2    Calcium 8.8 8.5 - 10.1 mg/dL    Total Bilirubin 1.9 (H) 0.2 - 1.0 mg/dL     (H) 15 - 37 U/L    ALT 49 12 - 78 U/L    Alk Phosphatase 116 45 - 117 U/L    Total Protein 8.1 6.4 - 8.2 g/dL    Albumin 3.5 3.5 - 5.0 g/dL    Globulin 4.6 (H) 2.0 - 4.0 g/dL    Albumin/Globulin Ratio 0.8 (L) 1.1 - 2.2     Magnesium    Collection Time: 06/14/25 11:26 AM   Result Value Ref Range    Magnesium 1.3 (L) 1.6 - 2.4 mg/dL     CT Head W/O Contrast  Result Date: 6/14/2025  EXAM: CT HEAD WO CONTRAST CLINICAL HISTORY: Dizziness INDICATION: Dizziness COMPARISON: 2012. CT dose reduction was achieved through use of a standardized protocol tailored for this examination and automatic exposure control for dose modulation. TECHNIQUE: Serial axial images with a collimation of 5 mm were obtained from the skull base through the vertex.  Serial axial images with a collimation of 5 mm were obtained from the skull base through the vertex.  Sagittal and coronal reformatted images also obtained.  FINDINGS: The sulci and ventricles are within normal limits for patient age. There is no evidence of an acute infarction, hemorrhage, or mass-effect. There is no evidence of midline shift or hydrocephalus. Posterior fossa structures are unremarkable. No extra-axial collections are seen. Mastoid air cells are well pneumatized and clear.  There is no evidence of depressed skull fractures of soft tissue swelling.     No acute intracranial process. There is no intracranial mass or hemorrhage. Electronically signed by GOLDEN MCGUIRE    XR CHEST PORTABLE  Result Date: 6/14/2025  EXAM:  XR CHEST PORTABLE INDICATION: SOB COMPARISON: 9/13/2020 TECHNIQUE: portable chest AP view FINDINGS: The cardiac silhouette is within normal limits. The pulmonary vasculature is within normal limits. The lungs and pleural spaces are clear. The visualized bones and upper abdomen are age-appropriate.

## 2025-06-14 NOTE — ED PROVIDER NOTES
[facility] EMERGENCY DEPT  EMERGENCY DEPARTMENT HISTORY AND PHYSICAL EXAM      Date of evaluation: 6/14/2025  Patient Name: Guero Sena  Birthdate 1960  MRN: [id number]  ED Provider: Jamal Dietrich MD   Note Started: 4:44 PM EDT 6/14/25    HISTORY OF PRESENT ILLNESS     Chief Complaint   Patient presents with    Dizziness    Fatigue       History Provided By: Patient, spouse     HPI: Guero Sena is a 64 y.o. male with past medical history as reviewed below presents for evaluation of lightheadedness.  Patient states that he has had intermittent feeling since this morning after taking his home metoprolol, which is a recently increased dose.  Was seen here earlier diagnosed with anxiety but also found to have electrolyte abnormalities which were repleted.  He is asymptomatic now    PAST MEDICAL HISTORY   Past Medical History:  Past Medical History:   Diagnosis Date    CAD (coronary artery disease)     Diabetes (HCC)     Hypertension        Past Surgical History:  Past Surgical History:   Procedure Laterality Date    IR INJ SINOGRAM THERAPEUTIC  1998       Family History:  Family History   Problem Relation Age of Onset    Hypertension Mother     Hypertension Father     Diabetes Father     Hypertension Sister     Hypertension Brother     Stroke Brother     Arrhythmia Sister        Social History:  Social History     Tobacco Use    Smoking status: Former     Current packs/day: 0.00     Types: Cigarettes     Quit date: 1/1/2010     Years since quitting: 15.4    Smokeless tobacco: Never   Substance Use Topics    Alcohol use: Yes     Alcohol/week: 2.5 - 3.3 standard drinks of alcohol    Drug use: No       Allergies:  No Known Allergies    PCP: Mika Alcala MD    Current Meds:   No current facility-administered medications for this encounter.     Current Outpatient Medications   Medication Sig Dispense Refill    ondansetron (ZOFRAN-ODT) 4 MG disintegrating tablet Take 1 tablet by mouth 3 times daily as  agreed upon. The patient is to follow up as recommended or return to ER should their symptoms worsen.      PATIENT REFERRED TO:  Mika Alcala MD  [address]  [facility] [address] [phone]    Schedule an appointment as soon as possible for a visit           DISCHARGE MEDICATIONS:     Medication List        START taking these medications      ondansetron 4 MG disintegrating tablet  Commonly known as: ZOFRAN-ODT  Take 1 tablet by mouth 3 times daily as needed for Nausea or Vomiting            ASK your doctor about these medications      amLODIPine 5 MG tablet  Commonly known as: NORVASC     aspirin 81 MG chewable tablet     dilTIAZem 240 MG extended release capsule  Commonly known as: TIAZAC     metFORMIN (MOD) 500 MG extended release tablet  Commonly known as: GLUMETZA     simvastatin 20 MG tablet  Commonly known as: ZOCOR               Where to Get Your Medications        These medications were sent to [facility]/pharmacy [address] - [address] [facility] - P [phone] - F [phone]  [address] [facility] [address]      Phone: [phone]   ondansetron 4 MG disintegrating tablet           DISCONTINUED MEDICATIONS:  Current Discharge Medication List          I am the Primary Clinician of Record. Jamal Dietrich MD (electronically signed)    (Please note that parts of this dictation were completed with voice recognition software. Quite often unanticipated grammatical, syntax, homophones, and other interpretive errors are inadvertently transcribed by the computer software. Please disregards these errors. Please excuse any errors that have escaped final proofreading.)        Jamal Dietrich MD  06/14/25 0112

## 2025-06-14 NOTE — ED TRIAGE NOTES
Pt took 1/2 dose of normal BP med (metoprolol 50mg) and states he felt \"funny\" describing anxiety symptoms. A/O X4. On arrival. Notes tingling in gómez fingers/feet. Denies other symptoms.

## 2025-06-14 NOTE — ED NOTES
No s/s of distress. No current c/o pain. Vss. Family at bedside. Iv d/c'd without difficulty. Verbalizes understanding of meds/instructions/RX.

## 2025-06-14 NOTE — ED TRIAGE NOTES
Was seen here earlier this AM for increased weakness, fatigue, dizziness and bilateral extremity numbness, no change from previous visit, currently alert and oriented, no acute distress

## 2025-06-16 LAB
EKG ATRIAL RATE: 69 BPM
EKG ATRIAL RATE: 71 BPM
EKG DIAGNOSIS: NORMAL
EKG DIAGNOSIS: NORMAL
EKG P AXIS: 59 DEGREES
EKG P AXIS: 72 DEGREES
EKG P-R INTERVAL: 182 MS
EKG P-R INTERVAL: 194 MS
EKG Q-T INTERVAL: 420 MS
EKG Q-T INTERVAL: 434 MS
EKG QRS DURATION: 76 MS
EKG QRS DURATION: 80 MS
EKG QTC CALCULATION (BAZETT): 456 MS
EKG QTC CALCULATION (BAZETT): 465 MS
EKG R AXIS: 24 DEGREES
EKG R AXIS: 40 DEGREES
EKG T AXIS: 28 DEGREES
EKG T AXIS: 46 DEGREES
EKG VENTRICULAR RATE: 69 BPM
EKG VENTRICULAR RATE: 71 BPM

## (undated) DEVICE — TOWEL SURG W17XL27IN STD BLU COT NONFENESTRATED PREWASHED

## (undated) DEVICE — SOL INJ SOD CL 0.9% 1000ML BG --

## (undated) DEVICE — SEALER ENDOSCP L37CM NANO COAT BLNT TIP LAP DIV

## (undated) DEVICE — TROCAR: Brand: KII FIOS FIRST ENTRY

## (undated) DEVICE — APPLIER CLP M/L SHFT DIA5MM 15 LIG LIGAMAX 5

## (undated) DEVICE — SOLUTION IRRIG 500ML 0.9% SOD CHL USP POUR PLAS BTL

## (undated) DEVICE — 3M(TM) MEDIPORE(TM) +PAD SOFT CLOTH ADHESIVE WOUND DRESSING 3566: Brand: 3M™ MEDIPORE™

## (undated) DEVICE — PREP SKN CHLRAPRP APL 26ML STR --

## (undated) DEVICE — STAPLER ENDOSCP 45MM L34CM STD NAT ARTC LIN CUT ECHELON FLX

## (undated) DEVICE — TISSUE RETRIEVAL SYSTEM: Brand: INZII RETRIEVAL SYSTEM

## (undated) DEVICE — INTENDED FOR TISSUE SEPARATION, AND OTHER PROCEDURES THAT REQUIRE A SHARP SURGICAL BLADE TO PUNCTURE OR CUT.: Brand: BARD-PARKER ® CARBON RIB-BACK BLADES

## (undated) DEVICE — LAPAROSCOPIC CHOLE PACK: Brand: MEDLINE INDUSTRIES, INC.

## (undated) DEVICE — DERMABOND SKIN ADH 0.7ML -- DERMABOND ADVANCED 12/BX

## (undated) DEVICE — [HIGH FLOW INSUFFLATOR,  DO NOT USE IF PACKAGE IS DAMAGED,  KEEP DRY,  KEEP AWAY FROM SUNLIGHT,  PROTECT FROM HEAT AND RADIOACTIVE SOURCES.]: Brand: PNEUMOSURE

## (undated) DEVICE — 2, DISPOSABLE SUCTION/IRRIGATOR WITHOUT DISPOSABLE TIP: Brand: STRYKEFLOW

## (undated) DEVICE — TROCARS: Brand: KII® BALLOON BLUNT TIP SYSTEM

## (undated) DEVICE — SOUTHSIDE TURNOVER: Brand: MEDLINE INDUSTRIES, INC.

## (undated) DEVICE — GARMENT CMPR STD UNV CALF FLWT -- RN VENTILATE NS DISP 17- IN

## (undated) DEVICE — GARMENT,MEDLINE,DVT,INT,CALF,MED, GEN2: Brand: MEDLINE

## (undated) DEVICE — DRAPE,REIN 53X77,STERILE: Brand: MEDLINE

## (undated) DEVICE — TRAY URIN CATH PED 16FR BLLN 5CC INDWL STR TIP INF CTRL

## (undated) DEVICE — BASIC SINGLE BASIN-LF: Brand: MEDLINE INDUSTRIES, INC.

## (undated) DEVICE — SUT MONOCRYL PLUS UD 4-0 --

## (undated) DEVICE — Device: Brand: JELCO

## (undated) DEVICE — TOTAL TRAY, 16FR 10ML SIL FOLEY, URN: Brand: MEDLINE

## (undated) DEVICE — SOLUTION SURG PREP 26 CC PURPREP

## (undated) DEVICE — SUTURE SZ 0 27IN 5/8 CIR UR-6  TAPER PT VIOLET ABSRB VICRYL J603H

## (undated) DEVICE — VISUALIZATION SYSTEM: Brand: CLEARIFY